# Patient Record
Sex: FEMALE | Race: OTHER | HISPANIC OR LATINO | URBAN - METROPOLITAN AREA
[De-identification: names, ages, dates, MRNs, and addresses within clinical notes are randomized per-mention and may not be internally consistent; named-entity substitution may affect disease eponyms.]

---

## 2017-09-08 ENCOUNTER — OUTPATIENT (OUTPATIENT)
Dept: OUTPATIENT SERVICES | Facility: HOSPITAL | Age: 32
LOS: 1 days | End: 2017-09-08
Payer: COMMERCIAL

## 2017-09-08 DIAGNOSIS — O26.899 OTHER SPECIFIED PREGNANCY RELATED CONDITIONS, UNSPECIFIED TRIMESTER: ICD-10-CM

## 2017-09-08 DIAGNOSIS — Z3A.00 WEEKS OF GESTATION OF PREGNANCY NOT SPECIFIED: ICD-10-CM

## 2017-09-08 PROCEDURE — 59025 FETAL NON-STRESS TEST: CPT

## 2017-09-08 PROCEDURE — G0463: CPT

## 2017-09-09 ENCOUNTER — INPATIENT (INPATIENT)
Facility: HOSPITAL | Age: 32
LOS: 2 days | Discharge: HOME | End: 2017-09-12
Attending: STUDENT IN AN ORGANIZED HEALTH CARE EDUCATION/TRAINING PROGRAM | Admitting: STUDENT IN AN ORGANIZED HEALTH CARE EDUCATION/TRAINING PROGRAM
Payer: COMMERCIAL

## 2017-09-09 VITALS
SYSTOLIC BLOOD PRESSURE: 110 MMHG | DIASTOLIC BLOOD PRESSURE: 54 MMHG | RESPIRATION RATE: 17 BRPM | HEART RATE: 118 BPM | OXYGEN SATURATION: 99 %

## 2017-09-09 DIAGNOSIS — Z3A.00 WEEKS OF GESTATION OF PREGNANCY NOT SPECIFIED: ICD-10-CM

## 2017-09-09 DIAGNOSIS — O26.899 OTHER SPECIFIED PREGNANCY RELATED CONDITIONS, UNSPECIFIED TRIMESTER: ICD-10-CM

## 2017-09-09 DIAGNOSIS — Z34.80 ENCOUNTER FOR SUPERVISION OF OTHER NORMAL PREGNANCY, UNSPECIFIED TRIMESTER: ICD-10-CM

## 2017-09-09 LAB
ANION GAP SERPL CALC-SCNC: 13 MMOL/L — SIGNIFICANT CHANGE UP (ref 5–17)
APTT BLD: 28.2 SEC — SIGNIFICANT CHANGE UP (ref 27.5–37.4)
BLD GP AB SCN SERPL QL: NEGATIVE — SIGNIFICANT CHANGE UP
BUN SERPL-MCNC: 7 MG/DL — SIGNIFICANT CHANGE UP (ref 7–23)
CALCIUM SERPL-MCNC: 8.4 MG/DL — SIGNIFICANT CHANGE UP (ref 8.4–10.5)
CHLORIDE SERPL-SCNC: 106 MMOL/L — SIGNIFICANT CHANGE UP (ref 96–108)
CO2 SERPL-SCNC: 20 MMOL/L — LOW (ref 22–31)
CREAT SERPL-MCNC: 0.81 MG/DL — SIGNIFICANT CHANGE UP (ref 0.5–1.3)
GLUCOSE SERPL-MCNC: 123 MG/DL — HIGH (ref 70–99)
HCT VFR BLD CALC: 22.3 % — LOW (ref 34.5–45)
HCT VFR BLD CALC: 22.8 % — LOW (ref 34.5–45)
HCT VFR BLD CALC: 38.8 % — SIGNIFICANT CHANGE UP (ref 34.5–45)
HGB BLD-MCNC: 13.4 G/DL — SIGNIFICANT CHANGE UP (ref 11.5–15.5)
HGB BLD-MCNC: 7.8 G/DL — LOW (ref 11.5–15.5)
HGB BLD-MCNC: 7.9 G/DL — LOW (ref 11.5–15.5)
INR BLD: 1.02 RATIO — SIGNIFICANT CHANGE UP (ref 0.88–1.16)
MCHC RBC-ENTMCNC: 28.9 PG — SIGNIFICANT CHANGE UP (ref 27–34)
MCHC RBC-ENTMCNC: 29.5 PG — SIGNIFICANT CHANGE UP (ref 27–34)
MCHC RBC-ENTMCNC: 29.7 PG — SIGNIFICANT CHANGE UP (ref 27–34)
MCHC RBC-ENTMCNC: 34.5 GM/DL — SIGNIFICANT CHANGE UP (ref 32–36)
MCHC RBC-ENTMCNC: 34.6 GM/DL — SIGNIFICANT CHANGE UP (ref 32–36)
MCHC RBC-ENTMCNC: 35 GM/DL — SIGNIFICANT CHANGE UP (ref 32–36)
MCV RBC AUTO: 83.7 FL — SIGNIFICANT CHANGE UP (ref 80–100)
MCV RBC AUTO: 84.9 FL — SIGNIFICANT CHANGE UP (ref 80–100)
MCV RBC AUTO: 85.3 FL — SIGNIFICANT CHANGE UP (ref 80–100)
PLATELET # BLD AUTO: 107 K/UL — LOW (ref 150–400)
PLATELET # BLD AUTO: 119 K/UL — LOW (ref 150–400)
PLATELET # BLD AUTO: 150 K/UL — SIGNIFICANT CHANGE UP (ref 150–400)
POTASSIUM SERPL-MCNC: 4.5 MMOL/L — SIGNIFICANT CHANGE UP (ref 3.5–5.3)
POTASSIUM SERPL-SCNC: 4.5 MMOL/L — SIGNIFICANT CHANGE UP (ref 3.5–5.3)
PROTHROM AB SERPL-ACNC: 11 SEC — SIGNIFICANT CHANGE UP (ref 9.8–12.7)
RBC # BLD: 2.63 M/UL — LOW (ref 3.8–5.2)
RBC # BLD: 2.67 M/UL — LOW (ref 3.8–5.2)
RBC # BLD: 4.63 M/UL — SIGNIFICANT CHANGE UP (ref 3.8–5.2)
RBC # FLD: 15.7 % — HIGH (ref 10.3–14.5)
RBC # FLD: 15.9 % — HIGH (ref 10.3–14.5)
RBC # FLD: 16.1 % — HIGH (ref 10.3–14.5)
RH IG SCN BLD-IMP: POSITIVE — SIGNIFICANT CHANGE UP
RH IG SCN BLD-IMP: POSITIVE — SIGNIFICANT CHANGE UP
SODIUM SERPL-SCNC: 139 MMOL/L — SIGNIFICANT CHANGE UP (ref 135–145)
T PALLIDUM AB TITR SER: NEGATIVE — SIGNIFICANT CHANGE UP
WBC # BLD: 15.9 K/UL — HIGH (ref 3.8–10.5)
WBC # BLD: 18.8 K/UL — HIGH (ref 3.8–10.5)
WBC # BLD: 19.8 K/UL — HIGH (ref 3.8–10.5)
WBC # FLD AUTO: 15.9 K/UL — HIGH (ref 3.8–10.5)
WBC # FLD AUTO: 18.8 K/UL — HIGH (ref 3.8–10.5)
WBC # FLD AUTO: 19.8 K/UL — HIGH (ref 3.8–10.5)

## 2017-09-09 RX ORDER — PENICILLIN G POTASSIUM 5000000 [IU]/1
POWDER, FOR SOLUTION INTRAMUSCULAR; INTRAPLEURAL; INTRATHECAL; INTRAVENOUS
Qty: 0 | Refills: 0 | Status: DISCONTINUED | OUTPATIENT
Start: 2017-09-09 | End: 2017-09-09

## 2017-09-09 RX ORDER — OXYTOCIN 10 UNIT/ML
41.67 VIAL (ML) INJECTION
Qty: 20 | Refills: 0 | Status: DISCONTINUED | OUTPATIENT
Start: 2017-09-09 | End: 2017-09-09

## 2017-09-09 RX ORDER — TRANEXAMIC ACID 100 MG/ML
1000 INJECTION, SOLUTION INTRAVENOUS ONCE
Qty: 0 | Refills: 0 | Status: COMPLETED | OUTPATIENT
Start: 2017-09-09 | End: 2017-09-09

## 2017-09-09 RX ORDER — IBUPROFEN 200 MG
600 TABLET ORAL EVERY 6 HOURS
Qty: 0 | Refills: 0 | Status: COMPLETED | OUTPATIENT
Start: 2017-09-09 | End: 2018-08-08

## 2017-09-09 RX ORDER — LANOLIN
1 OINTMENT (GRAM) TOPICAL EVERY 6 HOURS
Qty: 0 | Refills: 0 | Status: DISCONTINUED | OUTPATIENT
Start: 2017-09-09 | End: 2017-09-12

## 2017-09-09 RX ORDER — TETANUS TOXOID, REDUCED DIPHTHERIA TOXOID AND ACELLULAR PERTUSSIS VACCINE, ADSORBED 5; 2.5; 8; 8; 2.5 [IU]/.5ML; [IU]/.5ML; UG/.5ML; UG/.5ML; UG/.5ML
0.5 SUSPENSION INTRAMUSCULAR ONCE
Qty: 0 | Refills: 0 | Status: DISCONTINUED | OUTPATIENT
Start: 2017-09-09 | End: 2017-09-12

## 2017-09-09 RX ORDER — DIBUCAINE 1 %
1 OINTMENT (GRAM) RECTAL EVERY 4 HOURS
Qty: 0 | Refills: 0 | Status: DISCONTINUED | OUTPATIENT
Start: 2017-09-09 | End: 2017-09-12

## 2017-09-09 RX ORDER — SODIUM CHLORIDE 9 MG/ML
2000 INJECTION, SOLUTION INTRAVENOUS ONCE
Qty: 0 | Refills: 0 | Status: DISCONTINUED | OUTPATIENT
Start: 2017-09-09 | End: 2017-09-12

## 2017-09-09 RX ORDER — CARBOPROST TROMETHAMINE 250 UG/ML
250 INJECTION, SOLUTION INTRAMUSCULAR ONCE
Qty: 0 | Refills: 0 | Status: COMPLETED | OUTPATIENT
Start: 2017-09-09 | End: 2017-09-09

## 2017-09-09 RX ORDER — SODIUM CHLORIDE 9 MG/ML
1000 INJECTION, SOLUTION INTRAVENOUS
Qty: 0 | Refills: 0 | Status: DISCONTINUED | OUTPATIENT
Start: 2017-09-09 | End: 2017-09-09

## 2017-09-09 RX ORDER — OXYCODONE HYDROCHLORIDE 5 MG/1
5 TABLET ORAL
Qty: 0 | Refills: 0 | Status: DISCONTINUED | OUTPATIENT
Start: 2017-09-09 | End: 2017-09-12

## 2017-09-09 RX ORDER — PRAMOXINE HYDROCHLORIDE 150 MG/15G
1 AEROSOL, FOAM RECTAL EVERY 4 HOURS
Qty: 0 | Refills: 0 | Status: DISCONTINUED | OUTPATIENT
Start: 2017-09-09 | End: 2017-09-12

## 2017-09-09 RX ORDER — SODIUM CHLORIDE 9 MG/ML
3 INJECTION INTRAMUSCULAR; INTRAVENOUS; SUBCUTANEOUS EVERY 8 HOURS
Qty: 0 | Refills: 0 | Status: DISCONTINUED | OUTPATIENT
Start: 2017-09-09 | End: 2017-09-09

## 2017-09-09 RX ORDER — AER TRAVELER 0.5 G/1
1 SOLUTION RECTAL; TOPICAL EVERY 4 HOURS
Qty: 0 | Refills: 0 | Status: DISCONTINUED | OUTPATIENT
Start: 2017-09-09 | End: 2017-09-09

## 2017-09-09 RX ORDER — SIMETHICONE 80 MG/1
80 TABLET, CHEWABLE ORAL EVERY 6 HOURS
Qty: 0 | Refills: 0 | Status: DISCONTINUED | OUTPATIENT
Start: 2017-09-09 | End: 2017-09-12

## 2017-09-09 RX ORDER — HYDROMORPHONE HYDROCHLORIDE 2 MG/ML
0.5 INJECTION INTRAMUSCULAR; INTRAVENOUS; SUBCUTANEOUS ONCE
Qty: 0 | Refills: 0 | Status: DISCONTINUED | OUTPATIENT
Start: 2017-09-09 | End: 2017-09-09

## 2017-09-09 RX ORDER — OXYTOCIN 10 UNIT/ML
333.33 VIAL (ML) INJECTION
Qty: 20 | Refills: 0 | Status: COMPLETED | OUTPATIENT
Start: 2017-09-09 | End: 2017-09-09

## 2017-09-09 RX ORDER — ACETAMINOPHEN 500 MG
975 TABLET ORAL EVERY 6 HOURS
Qty: 0 | Refills: 0 | Status: DISCONTINUED | OUTPATIENT
Start: 2017-09-09 | End: 2017-09-12

## 2017-09-09 RX ORDER — GLYCERIN ADULT
1 SUPPOSITORY, RECTAL RECTAL AT BEDTIME
Qty: 0 | Refills: 0 | Status: DISCONTINUED | OUTPATIENT
Start: 2017-09-09 | End: 2017-09-12

## 2017-09-09 RX ORDER — HYDROCORTISONE 1 %
1 OINTMENT (GRAM) TOPICAL EVERY 4 HOURS
Qty: 0 | Refills: 0 | Status: DISCONTINUED | OUTPATIENT
Start: 2017-09-09 | End: 2017-09-09

## 2017-09-09 RX ORDER — IBUPROFEN 200 MG
600 TABLET ORAL EVERY 6 HOURS
Qty: 0 | Refills: 0 | Status: DISCONTINUED | OUTPATIENT
Start: 2017-09-09 | End: 2017-09-12

## 2017-09-09 RX ORDER — DOCUSATE SODIUM 100 MG
100 CAPSULE ORAL
Qty: 0 | Refills: 0 | Status: DISCONTINUED | OUTPATIENT
Start: 2017-09-09 | End: 2017-09-12

## 2017-09-09 RX ORDER — AER TRAVELER 0.5 G/1
1 SOLUTION RECTAL; TOPICAL EVERY 4 HOURS
Qty: 0 | Refills: 0 | Status: DISCONTINUED | OUTPATIENT
Start: 2017-09-09 | End: 2017-09-12

## 2017-09-09 RX ORDER — PENICILLIN G POTASSIUM 5000000 [IU]/1
5 POWDER, FOR SOLUTION INTRAMUSCULAR; INTRAPLEURAL; INTRATHECAL; INTRAVENOUS ONCE
Qty: 0 | Refills: 0 | Status: COMPLETED | OUTPATIENT
Start: 2017-09-09 | End: 2017-09-09

## 2017-09-09 RX ORDER — DIPHENHYDRAMINE HCL 50 MG
25 CAPSULE ORAL EVERY 6 HOURS
Qty: 0 | Refills: 0 | Status: DISCONTINUED | OUTPATIENT
Start: 2017-09-09 | End: 2017-09-12

## 2017-09-09 RX ORDER — MAGNESIUM HYDROXIDE 400 MG/1
30 TABLET, CHEWABLE ORAL
Qty: 0 | Refills: 0 | Status: DISCONTINUED | OUTPATIENT
Start: 2017-09-09 | End: 2017-09-12

## 2017-09-09 RX ORDER — PENICILLIN G POTASSIUM 5000000 [IU]/1
2.5 POWDER, FOR SOLUTION INTRAMUSCULAR; INTRAPLEURAL; INTRATHECAL; INTRAVENOUS EVERY 4 HOURS
Qty: 0 | Refills: 0 | Status: DISCONTINUED | OUTPATIENT
Start: 2017-09-09 | End: 2017-09-09

## 2017-09-09 RX ORDER — CITRIC ACID/SODIUM CITRATE 300-500 MG
15 SOLUTION, ORAL ORAL EVERY 4 HOURS
Qty: 0 | Refills: 0 | Status: DISCONTINUED | OUTPATIENT
Start: 2017-09-09 | End: 2017-09-09

## 2017-09-09 RX ORDER — SODIUM CHLORIDE 9 MG/ML
3 INJECTION INTRAMUSCULAR; INTRAVENOUS; SUBCUTANEOUS EVERY 8 HOURS
Qty: 0 | Refills: 0 | Status: DISCONTINUED | OUTPATIENT
Start: 2017-09-09 | End: 2017-09-12

## 2017-09-09 RX ORDER — ACETAMINOPHEN 500 MG
975 TABLET ORAL EVERY 6 HOURS
Qty: 0 | Refills: 0 | Status: COMPLETED | OUTPATIENT
Start: 2017-09-09 | End: 2018-08-08

## 2017-09-09 RX ORDER — SODIUM CHLORIDE 9 MG/ML
500 INJECTION, SOLUTION INTRAVENOUS ONCE
Qty: 0 | Refills: 0 | Status: DISCONTINUED | OUTPATIENT
Start: 2017-09-09 | End: 2017-09-09

## 2017-09-09 RX ORDER — OXYCODONE HYDROCHLORIDE 5 MG/1
5 TABLET ORAL EVERY 4 HOURS
Qty: 0 | Refills: 0 | Status: DISCONTINUED | OUTPATIENT
Start: 2017-09-09 | End: 2017-09-12

## 2017-09-09 RX ORDER — OXYTOCIN 10 UNIT/ML
41.67 VIAL (ML) INJECTION
Qty: 20 | Refills: 0 | Status: DISCONTINUED | OUTPATIENT
Start: 2017-09-09 | End: 2017-09-12

## 2017-09-09 RX ORDER — OXYTOCIN 10 UNIT/ML
333.33 VIAL (ML) INJECTION
Qty: 20 | Refills: 0 | Status: COMPLETED | OUTPATIENT
Start: 2017-09-09

## 2017-09-09 RX ORDER — DIBUCAINE 1 %
1 OINTMENT (GRAM) RECTAL EVERY 4 HOURS
Qty: 0 | Refills: 0 | Status: DISCONTINUED | OUTPATIENT
Start: 2017-09-09 | End: 2017-09-09

## 2017-09-09 RX ORDER — HYDROCORTISONE 1 %
1 OINTMENT (GRAM) TOPICAL EVERY 4 HOURS
Qty: 0 | Refills: 0 | Status: DISCONTINUED | OUTPATIENT
Start: 2017-09-09 | End: 2017-09-12

## 2017-09-09 RX ORDER — PRAMOXINE HYDROCHLORIDE 150 MG/15G
1 AEROSOL, FOAM RECTAL EVERY 4 HOURS
Qty: 0 | Refills: 0 | Status: DISCONTINUED | OUTPATIENT
Start: 2017-09-09 | End: 2017-09-09

## 2017-09-09 RX ORDER — KETOROLAC TROMETHAMINE 30 MG/ML
30 SYRINGE (ML) INJECTION ONCE
Qty: 0 | Refills: 0 | Status: DISCONTINUED | OUTPATIENT
Start: 2017-09-09 | End: 2017-09-12

## 2017-09-09 RX ADMIN — HYDROMORPHONE HYDROCHLORIDE 0.5 MILLIGRAM(S): 2 INJECTION INTRAMUSCULAR; INTRAVENOUS; SUBCUTANEOUS at 18:25

## 2017-09-09 RX ADMIN — CARBOPROST TROMETHAMINE 250 MICROGRAM(S): 250 INJECTION, SOLUTION INTRAMUSCULAR at 17:40

## 2017-09-09 RX ADMIN — Medication 975 MILLIGRAM(S): at 14:34

## 2017-09-09 RX ADMIN — Medication 0.2 MILLIGRAM(S): at 17:23

## 2017-09-09 RX ADMIN — TRANEXAMIC ACID 220 MILLIGRAM(S): 100 INJECTION, SOLUTION INTRAVENOUS at 18:20

## 2017-09-09 RX ADMIN — Medication 0.2 MILLIGRAM(S): at 21:41

## 2017-09-09 RX ADMIN — Medication 1000 MILLIUNIT(S)/MIN: at 13:47

## 2017-09-09 RX ADMIN — PENICILLIN G POTASSIUM 200 MILLION UNIT(S): 5000000 POWDER, FOR SOLUTION INTRAMUSCULAR; INTRAPLEURAL; INTRATHECAL; INTRAVENOUS at 06:09

## 2017-09-09 RX ADMIN — OXYCODONE HYDROCHLORIDE 5 MILLIGRAM(S): 5 TABLET ORAL at 15:10

## 2017-09-09 RX ADMIN — Medication 975 MILLIGRAM(S): at 15:04

## 2017-09-09 RX ADMIN — OXYCODONE HYDROCHLORIDE 5 MILLIGRAM(S): 5 TABLET ORAL at 14:40

## 2017-09-09 RX ADMIN — Medication 0.2 MILLIGRAM(S): at 17:55

## 2017-09-09 NOTE — PATIENT PROFILE OB - NS PRO PT RIGHT SUPPORT PERSON
Office Visit Note  [facility]  [phone]    Mar 21, 2017    [unfilled]    1972    UROLOGIC DIAGNOSES:    Ureteral stone    CURRENT INTERVENTIONS:    S/P extraction    History:    Eugenie returns to clinic today for postoperative stent removal. The stent has been bothersome to her but overall she has felt well since the procedure. Stone was composed of calcium oxalate monohydrate.      Imaging:      Labs:      MEDICATIONS:    Current Outpatient Prescriptions:      ibuprofen (ADVIL/MOTRIN) 600 MG tablet, Take 600 mg by mouth, Disp: , Rfl:      nitrofurantoin, macrocrystal-monohydrate, (MACROBID) 100 MG capsule, Take 1 capsule (100 mg) by mouth once for 1 dose, Disp: 1 capsule, Rfl: 0     oxybutynin (DITROPAN XL) 10 MG 24 hr tablet, Take 1 tablet (10 mg) by mouth daily, Disp: 30 tablet, Rfl: 0     HYDROcodone-acetaminophen (NORCO) 5-325 MG per tablet, Take 1-2 tablets by mouth every 4 hours as needed for moderate to severe pain (Moderate to Severe Pain), Disp: 5 tablet, Rfl: 0     tamsulosin (FLOMAX) 0.4 MG capsule, Take 1 capsule (0.4 mg) by mouth daily, Disp: 8 capsule, Rfl: 1     oxyCODONE-acetaminophen (PERCOCET) 5-325 MG per tablet, Take 1-2 tablets by mouth every 4 hours as needed for moderate to severe pain, Disp: 15 tablet, Rfl: 0     tamsulosin (FLOMAX) 0.4 MG capsule, Take 1 capsule (0.4 mg) by mouth daily, Disp: 7 capsule, Rfl: 1     SUMAtriptan (IMITREX) 6 MG/0.5ML SOLN, Inject 6 mg Subcutaneous every 2 hours as needed (migraine. ) Max dose is 12mg/24hrs, Disp: , Rfl:     ALLERGIES:     Allergies   Allergen Reactions     Ciprofloxacin Hives     Morphine Sulfate      Sulfa Drugs Hives       REVIEW OF SYSTEMS: Ten point review of systems without change as outlined in HPI    SURGICAL HISTORY:    Past Surgical History   Procedure Laterality Date     Hysterectomy       Tonsillectomy & adenoidectomy       Cl aff surgical pathology       Appendectomy       Cholecystectomy        "Laparoscopy       Cl aff surgical pathology       C removal of kidney stone       Discectomy lumbar posterior microscopic one level  9/3/2013     Procedure: DISCECTOMY LUMBAR POSTERIOR MICROSCOPIC ONE LEVEL;  Left Lumbar 5-Sacral 1 Microdiscectomy ;  Surgeon: Karl Anglin MD;  Location: [facility] OR     Orthopedic surgery       Combined cystoscopy, retrogrades, ureteroscopy, laser holmium lithotripsy ureter(s), insert stent Left 3/13/2017     Procedure: COMBINED CYSTOSCOPY, RETROGRADES, URETEROSCOPY, LASER HOLMIUM LITHOTRIPSY URETER(S), INSERT STENT;  Surgeon: Greg Luque MD;  Location: [facility] OR         PHYSICAL EXAM:    Ht 1.651 m (5' 5\")  Wt 127 kg (280 lb)  BMI 46.59 kg/m2    HEENT: Normocephalic and atraumatic    Cardiac: Not done    Back/Flank: Not done    CNS/PNS: Alert and oriented    Respiratory: Normal nonlabored breathing    Abdomen: Soft nontender and nondistended    Peripheral Vascular: No peripheral edema    Mental Status: Normal    External Genitalia: Not done    Bladder: Not done    Urethra: Not done     Vagina: Not done    Cystoscopy:  I performed flexible cystoscopy today and the stent was removed without difficulty      Urinalysis:  UA RESULTS:  Recent Labs   Lab Test  03/12/17   1624   COLOR  Radha   APPEARANCE  Clear   URINEGLC  Negative   URINEBILI  Negative   URINEKETONE  5*   SG  1.017   UBLD  Small*   URINEPH  6.0   PROTEIN  Negative   NITRITE  Positive*   LEUKEST  Negative   RBCU  17*   WBCU  2         IMPRESSION:    Doing well, stent removed    PLAN:    We discussed prevention methods for future stones. She will follow-up with me as needed in the future.    Total Time:  15 minutes  Time in Consultation: 10 minutes      Greg Luque M.D.        " Yes

## 2017-09-09 NOTE — PROVIDER CONTACT NOTE (OTHER) - ACTION/TREATMENT ORDERED:
Was examined by Dr. Daniel and manaually expressed larged blood clots , approx another 500ml's . methergine .2mg given and hemabate v250 mcg given. CBC drawn and sent to lab. Pt transferred to l and manohar

## 2017-09-09 NOTE — PROVIDER CONTACT NOTE (OTHER) - ASSESSMENT
Pt alert and oriented. B/P 101/62, pulse 88 and resp 18 .Fundus firm . Lochia rubra and large amount.

## 2017-09-09 NOTE — CHART NOTE - NSCHARTNOTEFT_GEN_A_CORE
Patient is 32yoF  POD0 from vacuum assisted vaginal delivery for NRFHT. Called to pt bedside for continued bleeding on PP floor. Pt with 300cc of blood on pads and blue chux over a 2hr period. Additionally, pt and her partner report that she had a syncopal episode without fall while sitting on the toilet in L&D    Vital Signs Last 24 Hrs  T(C): 36.8 (09 Sep 2017 16:45), Max: 37.9 (09 Sep 2017 13:50)  T(F): 98.2 (09 Sep 2017 16:45), Max: 100.2 (09 Sep 2017 13:50)  HR: 88 (09 Sep 2017 16:45) (70 - 118)  BP: 101/62 (09 Sep 2017 16:45) (101/62 - 117/76)  BP(mean): --  RR: 18 (09 Sep 2017 16:45) (17 - 18)  SpO2: 98% (09 Sep 2017 16:45) (98% - 99%)  I&O's Detail    09 Sep 2017 07:01  -  09 Sep 2017 17:13  --------------------------------------------------------  IN:    dextrose 5% + lactated ringers.: 350 mL    Lactated Ringers IV Bolus: 1500 mL    oxytocin Infusion: 1000 mL  Total IN: 2850 mL    OUT:    Estimated Blood Loss: 500 mL    Intermittent Catheterization - Urethral: 800 mL  Total OUT: 1300 mL    Total NET: 1550 mL          Labs:                        13.4   15.9  )-----------( 150      ( 09 Sep 2017 05:52 )             38.8       Fibrinogen:   Lactate:   MEDICATIONS  (STANDING):  ketorolac   Injectable 30 milliGRAM(s) IV Push once  oxyCODONE    IR 5 milliGRAM(s) Oral every 3 hours  sodium chloride 0.9% lock flush 3 milliLiter(s) IV Push every 8 hours  diphtheria/tetanus/pertussis (acellular) Vaccine (ADAcel) 0.5 milliLiter(s) IntraMuscular once  oxytocin Infusion 41.667 milliUNIT(s)/Min (125 mL/Hr) IV Continuous <Continuous>  prenatal multivitamin 1 Tablet(s) Oral daily  acetaminophen   Tablet. 975 milliGRAM(s) Oral every 6 hours  ibuprofen  Tablet 600 milliGRAM(s) Oral every 6 hours      Evaluation :      Management and Follow-up plan :      Libby Briscoe MD PGY1 Patient is 32yoF  POD0 from vacuum assisted vaginal delivery for NRFHT. Called to pt bedside for continued bleeding on PP floor. Pt with 250cc of blood on pads and blue chux over a 2hr period. Additionally, pt and her partner report that she had a syncopal episode without fall while sitting on the toilet in L&D. Pt reports she feels well overall but that she has not moved much since coming to the floor. Pt has not stood since she has been on the floor.     Vital Signs Last 24 Hrs  T(C): 36.8 (09 Sep 2017 16:45), Max: 37.9 (09 Sep 2017 13:50)  T(F): 98.2 (09 Sep 2017 16:45), Max: 100.2 (09 Sep 2017 13:50)  HR: 88 (09 Sep 2017 16:45) (70 - 118)  BP: 101/62 (09 Sep 2017 16:45) (101/62 - 117/76)  BP(mean): --  RR: 18 (09 Sep 2017 16:45) (17 - 18)  SpO2: 98% (09 Sep 2017 16:45) (98% - 99%)  I&O's Detail    09 Sep 2017 07:01  -  09 Sep 2017 17:13  --------------------------------------------------------  IN:    dextrose 5% + lactated ringers.: 350 mL    Lactated Ringers IV Bolus: 1500 mL    oxytocin Infusion: 1000 mL  Total IN: 2850 mL    OUT:    Estimated Blood Loss: 500 mL    Intermittent Catheterization - Urethral: 800 mL  Total OUT: 1300 mL    Total NET: 1550 mL          Labs:                        13.4   15.9  )-----------( 150      ( 09 Sep 2017 05:52 )             38.8       Fibrinogen:   Lactate:   MEDICATIONS  (STANDING):  ketorolac   Injectable 30 milliGRAM(s) IV Push once  oxyCODONE    IR 5 milliGRAM(s) Oral every 3 hours  sodium chloride 0.9% lock flush 3 milliLiter(s) IV Push every 8 hours  diphtheria/tetanus/pertussis (acellular) Vaccine (ADAcel) 0.5 milliLiter(s) IntraMuscular once  oxytocin Infusion 41.667 milliUNIT(s)/Min (125 mL/Hr) IV Continuous <Continuous>  prenatal multivitamin 1 Tablet(s) Oral daily  acetaminophen   Tablet. 975 milliGRAM(s) Oral every 6 hours  ibuprofen  Tablet 600 milliGRAM(s) Oral every 6 hours      Evaluation :  Fundus boggy. Dr. Daniel performed a manual exam and extracted an additional 175cc of clot and blood. IM methergine and hemabate given. Uterus still boggy.    Management and Follow-up plan :  Pt to be brought down to PACU for closer monitoring. Second IV line to be placed with 2L bolus started. Additional dose of IM methergine to be given. TXA to be given. STAT CBC, BMP, and coags ordered.      Dr. Heaton aware and to be meeting patient in PACU.    Libby Briscoe MD PGY1

## 2017-09-09 NOTE — CHART NOTE - NSCHARTNOTEFT_GEN_A_CORE
R4 Note     Called to bedside for heavy vaginal bleeding on the postpartum floor. Pt was s/p a VAVD this morning with [id number], she had a PPH while in LDR of 300cc. She received 1000mcg cytotec per rectum and fundus was firm. Vital signs within normal limits at that time. She was stable after some time and went to postpartum floor. At bedside, patient had 300cc of blood on king and fundus was boggy. A bimanual examination was performed with evacuation of 200cc of clot with continued slow bleed. Methergine 0.2mcg IM and Hemabate 0.25mcg IM were given. Pt was brought down to recovery room where a 2nd IV line was placed, 2L IV fluid was given, she received TXA 1000mg IV and an additional dose of methergine 0.2mg IM. Rodriguez catheter placed with 800cc output. Bedside sono performed with endometrial thickening of 2.5cm. Fundus became firm with no further bleeding. Vital signs remained HR 80s and /70s throughout and patient was asymptomatic. H/H returned 7.8/22.3 from 13.4/38.8.   Plan is for continued close monitoring of vital signs/UOP in PACU, Methergine series, and repeat CBC at 9pm. Will hold on transfusion as patient no longer bleeding, she is asymptomatic and vital signs wnl.   D/W Dr. Mehdi Daniel, PGY4

## 2017-09-09 NOTE — PROVIDER CONTACT NOTE (OTHER) - BACKGROUND
Primit  Vac assist Primit  Vac assist delivery day. No significant medical history. Pt had cytotec in recovery room. Had EBL 1000 ml's in l and d

## 2017-09-10 LAB
HCT VFR BLD CALC: 20.2 % — CRITICAL LOW (ref 34.5–45)
HCT VFR BLD CALC: 28.9 % — LOW (ref 34.5–45)
HGB BLD-MCNC: 10.1 G/DL — LOW (ref 11.5–15.5)
HGB BLD-MCNC: 6.9 G/DL — CRITICAL LOW (ref 11.5–15.5)
MCHC RBC-ENTMCNC: 29.8 PG — SIGNIFICANT CHANGE UP (ref 27–34)
MCHC RBC-ENTMCNC: 34.9 GM/DL — SIGNIFICANT CHANGE UP (ref 32–36)
MCV RBC AUTO: 85.5 FL — SIGNIFICANT CHANGE UP (ref 80–100)
PLATELET # BLD AUTO: 152 K/UL — SIGNIFICANT CHANGE UP (ref 150–400)
RBC # BLD: 3.38 M/UL — LOW (ref 3.8–5.2)
RBC # FLD: 17 % — HIGH (ref 10.3–14.5)
WBC # BLD: 17.7 K/UL — HIGH (ref 3.8–10.5)
WBC # FLD AUTO: 17.7 K/UL — HIGH (ref 3.8–10.5)

## 2017-09-10 RX ORDER — DIPHENHYDRAMINE HCL 50 MG
25 CAPSULE ORAL ONCE
Qty: 0 | Refills: 0 | Status: DISCONTINUED | OUTPATIENT
Start: 2017-09-10 | End: 2017-09-10

## 2017-09-10 RX ORDER — DIPHENHYDRAMINE HCL 50 MG
25 CAPSULE ORAL ONCE
Qty: 0 | Refills: 0 | Status: COMPLETED | OUTPATIENT
Start: 2017-09-10 | End: 2017-09-10

## 2017-09-10 RX ORDER — ACETAMINOPHEN 500 MG
975 TABLET ORAL ONCE
Qty: 0 | Refills: 0 | Status: DISCONTINUED | OUTPATIENT
Start: 2017-09-10 | End: 2017-09-10

## 2017-09-10 RX ORDER — DIPHENHYDRAMINE HCL 50 MG
25 CAPSULE ORAL ONCE
Qty: 0 | Refills: 0 | Status: DISCONTINUED | OUTPATIENT
Start: 2017-09-10 | End: 2017-09-12

## 2017-09-10 RX ORDER — ACETAMINOPHEN 500 MG
975 TABLET ORAL ONCE
Qty: 0 | Refills: 0 | Status: COMPLETED | OUTPATIENT
Start: 2017-09-10 | End: 2017-09-10

## 2017-09-10 RX ADMIN — SODIUM CHLORIDE 3 MILLILITER(S): 9 INJECTION INTRAMUSCULAR; INTRAVENOUS; SUBCUTANEOUS at 05:56

## 2017-09-10 RX ADMIN — Medication 975 MILLIGRAM(S): at 18:06

## 2017-09-10 RX ADMIN — Medication 0.2 MILLIGRAM(S): at 09:50

## 2017-09-10 RX ADMIN — Medication 975 MILLIGRAM(S): at 10:45

## 2017-09-10 RX ADMIN — Medication 975 MILLIGRAM(S): at 18:10

## 2017-09-10 RX ADMIN — SODIUM CHLORIDE 3 MILLILITER(S): 9 INJECTION INTRAMUSCULAR; INTRAVENOUS; SUBCUTANEOUS at 15:06

## 2017-09-10 RX ADMIN — Medication 0.2 MILLIGRAM(S): at 05:57

## 2017-09-10 RX ADMIN — SODIUM CHLORIDE 3 MILLILITER(S): 9 INJECTION INTRAMUSCULAR; INTRAVENOUS; SUBCUTANEOUS at 05:19

## 2017-09-10 RX ADMIN — SODIUM CHLORIDE 3 MILLILITER(S): 9 INJECTION INTRAMUSCULAR; INTRAVENOUS; SUBCUTANEOUS at 22:20

## 2017-09-10 RX ADMIN — Medication 0.2 MILLIGRAM(S): at 01:57

## 2017-09-10 RX ADMIN — Medication 25 MILLIGRAM(S): at 10:38

## 2017-09-10 RX ADMIN — Medication 0.2 MILLIGRAM(S): at 14:18

## 2017-09-10 RX ADMIN — Medication 0.2 MILLIGRAM(S): at 18:05

## 2017-09-10 RX ADMIN — Medication 25 MILLIGRAM(S): at 01:09

## 2017-09-10 RX ADMIN — Medication 1 TABLET(S): at 18:06

## 2017-09-10 NOTE — CHART NOTE - NSCHARTNOTEFT_GEN_A_CORE
32yoF  PPD#1 from vacuum assisted vaginal delivery complicated by PPH with total EBL 1250. Pt's H&H has continue to drop on AM labs. Orthostatics were completed following these results and were negative (114/64 96->127/63 114->122/58 104). Dr. Harding aware and would like the pt to receive 2 units of PRBC. Pt counseled on transfusion and is declining transfusion at present. Pt states she is asymptomatic and feels well.    Vital Signs Last 24 Hrs  T(C): 36.8 (10 Sep 2017 05:10), Max: 37.9 (09 Sep 2017 13:50)  T(F): 98.2 (10 Sep 2017 05:10), Max: 100.2 (09 Sep 2017 13:50)  HR: 104 (10 Sep 2017 07:46) (70 - 118)  BP: 122/58 (10 Sep 2017 07:46) (101/62 - 137/80)  BP(mean): 84 (10 Sep 2017 07:46) (84 - 103)  RR: 23 (10 Sep 2017 07:46) (17 - 26)  SpO2: 97% (10 Sep 2017 07:46) (97% - 100%)  I&O's Detail    09 Sep 2017 07:01  -  10 Sep 2017 07:00  --------------------------------------------------------  IN:    dextrose 5% + lactated ringers.: 350 mL    Lactated Ringers IV Bolus: 1500 mL    oxytocin Infusion: 1000 mL  Total IN: 2850 mL    OUT:    Estimated Blood Loss: 500 mL    Intermittent Catheterization - Urethral: 4560 mL  Total OUT: 5060 mL    Total NET: -2210 mL      10 Sep 2017 07:01  -  10 Sep 2017 09:15  --------------------------------------------------------  IN:  Total IN: 0 mL    OUT:    Intermittent Catheterization - Urethral: 350 mL  Total OUT: 350 mL    Total NET: -350 mL          Labs:                        6.9    x     )-----------( x        ( 10 Sep 2017 06:29 )             20.2                         7.9    18.8  )-----------( 107      ( 09 Sep 2017 21:02 )             22.8                         7.8    19.8  )-----------( 119      ( 09 Sep 2017 17:19 )             22.3                         13.4   15.9  )-----------( 150      ( 09 Sep 2017 05:52 )             38.8     PT/INR - ( 09 Sep 2017 18:07 )   PT: 11.0 sec;   INR: 1.02 ratio         PTT - ( 09 Sep 2017 18:07 )  PTT:28.2 sec  Fibrinogen:   Lactate:   MEDICATIONS  (STANDING):  ketorolac   Injectable 30 milliGRAM(s) IV Push once  oxyCODONE    IR 5 milliGRAM(s) Oral every 3 hours  sodium chloride 0.9% lock flush 3 milliLiter(s) IV Push every 8 hours  diphtheria/tetanus/pertussis (acellular) Vaccine (ADAcel) 0.5 milliLiter(s) IntraMuscular once  oxytocin Infusion 41.667 milliUNIT(s)/Min (125 mL/Hr) IV Continuous <Continuous>  prenatal multivitamin 1 Tablet(s) Oral daily  acetaminophen   Tablet. 975 milliGRAM(s) Oral every 6 hours  ibuprofen  Tablet 600 milliGRAM(s) Oral every 6 hours  lactated ringers Bolus 2000 milliLiter(s) IV Bolus once  methylergonovine 0.2 milliGRAM(s) Oral every 4 hours      Evaluation :  32yoF  PPD#1 from vacuum assisted vaginal delivery complicated by PPH with total EBL 1250. Continued drop in H&H. Pt counseled on transfusion and is declining transfusion at present. Pt to be seen by Dr. Harding who is en route to the hospital.        Libby Briscoe MD PGY1

## 2017-09-11 LAB
HCT VFR BLD CALC: 25.6 % — LOW (ref 34.5–45)
HGB BLD-MCNC: 8.4 G/DL — LOW (ref 11.5–15.5)
MCHC RBC-ENTMCNC: 28.3 PG — SIGNIFICANT CHANGE UP (ref 27–34)
MCHC RBC-ENTMCNC: 33 GM/DL — SIGNIFICANT CHANGE UP (ref 32–36)
MCV RBC AUTO: 85.7 FL — SIGNIFICANT CHANGE UP (ref 80–100)
PLATELET # BLD AUTO: 142 K/UL — LOW (ref 150–400)
RBC # BLD: 2.98 M/UL — LOW (ref 3.8–5.2)
RBC # FLD: 17.1 % — HIGH (ref 10.3–14.5)
WBC # BLD: 14.8 K/UL — HIGH (ref 3.8–10.5)
WBC # FLD AUTO: 14.8 K/UL — HIGH (ref 3.8–10.5)

## 2017-09-11 RX ADMIN — Medication 975 MILLIGRAM(S): at 06:16

## 2017-09-11 RX ADMIN — Medication 975 MILLIGRAM(S): at 00:39

## 2017-09-11 RX ADMIN — Medication 975 MILLIGRAM(S): at 14:17

## 2017-09-11 RX ADMIN — Medication 975 MILLIGRAM(S): at 00:09

## 2017-09-11 RX ADMIN — SODIUM CHLORIDE 3 MILLILITER(S): 9 INJECTION INTRAMUSCULAR; INTRAVENOUS; SUBCUTANEOUS at 14:54

## 2017-09-11 RX ADMIN — SODIUM CHLORIDE 3 MILLILITER(S): 9 INJECTION INTRAMUSCULAR; INTRAVENOUS; SUBCUTANEOUS at 06:18

## 2017-09-11 RX ADMIN — Medication 1 TABLET(S): at 11:06

## 2017-09-11 RX ADMIN — Medication 975 MILLIGRAM(S): at 06:46

## 2017-09-11 RX ADMIN — Medication 100 MILLIGRAM(S): at 11:05

## 2017-09-12 ENCOUNTER — TRANSCRIPTION ENCOUNTER (OUTPATIENT)
Age: 32
End: 2017-09-12

## 2017-09-12 VITALS
HEART RATE: 81 BPM | SYSTOLIC BLOOD PRESSURE: 115 MMHG | OXYGEN SATURATION: 100 % | RESPIRATION RATE: 18 BRPM | TEMPERATURE: 98 F | DIASTOLIC BLOOD PRESSURE: 73 MMHG

## 2017-09-12 DIAGNOSIS — D50.0 IRON DEFICIENCY ANEMIA SECONDARY TO BLOOD LOSS (CHRONIC): ICD-10-CM

## 2017-09-12 LAB
HCT VFR BLD CALC: 26.6 % — LOW (ref 34.5–45)
HGB BLD-MCNC: 9.1 G/DL — LOW (ref 11.5–15.5)
MCHC RBC-ENTMCNC: 29.5 PG — SIGNIFICANT CHANGE UP (ref 27–34)
MCHC RBC-ENTMCNC: 34.2 GM/DL — SIGNIFICANT CHANGE UP (ref 32–36)
MCV RBC AUTO: 86.2 FL — SIGNIFICANT CHANGE UP (ref 80–100)
PLATELET # BLD AUTO: 236 K/UL — SIGNIFICANT CHANGE UP (ref 150–400)
RBC # BLD: 3.09 M/UL — LOW (ref 3.8–5.2)
RBC # FLD: 17.1 % — HIGH (ref 10.3–14.5)
WBC # BLD: 14.1 K/UL — HIGH (ref 3.8–10.5)
WBC # FLD AUTO: 14.1 K/UL — HIGH (ref 3.8–10.5)

## 2017-09-12 PROCEDURE — 59050 FETAL MONITOR W/REPORT: CPT

## 2017-09-12 PROCEDURE — 86901 BLOOD TYPING SEROLOGIC RH(D): CPT

## 2017-09-12 PROCEDURE — 36430 TRANSFUSION BLD/BLD COMPNT: CPT

## 2017-09-12 PROCEDURE — 90707 MMR VACCINE SC: CPT

## 2017-09-12 PROCEDURE — 85610 PROTHROMBIN TIME: CPT

## 2017-09-12 PROCEDURE — 85027 COMPLETE CBC AUTOMATED: CPT

## 2017-09-12 PROCEDURE — 86900 BLOOD TYPING SEROLOGIC ABO: CPT

## 2017-09-12 PROCEDURE — 85018 HEMOGLOBIN: CPT

## 2017-09-12 PROCEDURE — 86850 RBC ANTIBODY SCREEN: CPT

## 2017-09-12 PROCEDURE — P9016: CPT

## 2017-09-12 PROCEDURE — 85730 THROMBOPLASTIN TIME PARTIAL: CPT

## 2017-09-12 PROCEDURE — 86923 COMPATIBILITY TEST ELECTRIC: CPT

## 2017-09-12 PROCEDURE — G0463: CPT

## 2017-09-12 PROCEDURE — 86780 TREPONEMA PALLIDUM: CPT

## 2017-09-12 PROCEDURE — 59025 FETAL NON-STRESS TEST: CPT

## 2017-09-12 PROCEDURE — 80048 BASIC METABOLIC PNL TOTAL CA: CPT

## 2017-09-12 RX ORDER — ACETAMINOPHEN 500 MG
3 TABLET ORAL
Qty: 0 | Refills: 0 | DISCHARGE
Start: 2017-09-12

## 2017-09-12 RX ORDER — IBUPROFEN 200 MG
1 TABLET ORAL
Qty: 0 | Refills: 0 | DISCHARGE
Start: 2017-09-12

## 2017-09-12 RX ADMIN — Medication 975 MILLIGRAM(S): at 12:30

## 2017-09-12 RX ADMIN — Medication 600 MILLIGRAM(S): at 00:34

## 2017-09-12 RX ADMIN — Medication 0.5 MILLILITER(S): at 12:00

## 2017-09-12 RX ADMIN — Medication 975 MILLIGRAM(S): at 12:00

## 2017-09-12 RX ADMIN — Medication 600 MILLIGRAM(S): at 01:00

## 2017-09-12 RX ADMIN — Medication 100 MILLIGRAM(S): at 07:11

## 2017-09-12 RX ADMIN — Medication 600 MILLIGRAM(S): at 12:30

## 2017-09-12 RX ADMIN — Medication 600 MILLIGRAM(S): at 11:59

## 2017-09-12 RX ADMIN — Medication 600 MILLIGRAM(S): at 18:14

## 2017-09-12 RX ADMIN — Medication 100 MILLIGRAM(S): at 12:00

## 2017-09-12 RX ADMIN — SODIUM CHLORIDE 3 MILLILITER(S): 9 INJECTION INTRAMUSCULAR; INTRAVENOUS; SUBCUTANEOUS at 00:00

## 2017-09-12 RX ADMIN — Medication 600 MILLIGRAM(S): at 18:45

## 2017-09-12 RX ADMIN — Medication 1 TABLET(S): at 12:07

## 2017-09-12 RX ADMIN — MAGNESIUM HYDROXIDE 30 MILLILITER(S): 400 TABLET, CHEWABLE ORAL at 18:14

## 2017-09-12 NOTE — PROGRESS NOTE ADULT - ASSESSMENT
33y/o  PPD#1 from  in stable condition. +750 PPH, s/p 2UPRBC. Hct: 40.2->20.2->2UPRBC->28.
A/P: 31yo  PPD#3 s/p VAVD.   Postpartum course c/b postpartum hemorrhage and secondary anemia for which pt received blood transfusion. Patient is stable and doing well post-partum.

## 2017-09-12 NOTE — DISCHARGE NOTE OB - INSTRUCTIONS
Any concerns in temperature or bleeding call MD or go to Emergency Department, [facility] fact sheet given.

## 2017-09-12 NOTE — PROGRESS NOTE ADULT - SUBJECTIVE AND OBJECTIVE BOX
Patient seen and examined at bedside, no acute overnight events. No acute complaints, pain well controlled. Patient is ambulating, voiding spontaneously, passing gas, and tolerating regular diet. Denies nausea, vomiting SOB, CP, dizziness, syncope, palpitations headache, fevers, chills.     Vital Signs Last 24 Hours  T(C): 36.7 (09-11-17 @ 00:40), Max: 37.9 (09-10-17 @ 10:55)  HR: 74 (09-11-17 @ 00:40) (74 - 104)  BP: 111/71 (09-11-17 @ 00:40) (106/64 - 127/77)  RR: 18 (09-11-17 @ 00:40) (18 - 26)  SpO2: 99% (09-11-17 @ 00:40) (96% - 99%)    Physical Exam:  General: NAD  Abdomen: Soft, non-tender, non-distended, fundus firm  Pelvic: Lochia wnl    Labs:    Blood Type: O Positive  Antibody Screen: --  RPR: Negative               10.1   17.7  )-----------( 152      ( 09-10 @ 22:33 )             28.9                6.9    x     )-----------( x        ( 09-10 @ 06:29 )             20.2                7.9    18.8  )-----------( 107      ( 09-09 @ 21:02 )             22.8         MEDICATIONS  (STANDING):  ketorolac   Injectable 30 milliGRAM(s) IV Push once  oxyCODONE    IR 5 milliGRAM(s) Oral every 3 hours  sodium chloride 0.9% lock flush 3 milliLiter(s) IV Push every 8 hours  diphtheria/tetanus/pertussis (acellular) Vaccine (ADAcel) 0.5 milliLiter(s) IntraMuscular once  oxytocin Infusion 41.667 milliUNIT(s)/Min (125 mL/Hr) IV Continuous <Continuous>  prenatal multivitamin 1 Tablet(s) Oral daily  acetaminophen   Tablet. 975 milliGRAM(s) Oral every 6 hours  ibuprofen  Tablet 600 milliGRAM(s) Oral every 6 hours  lactated ringers Bolus 2000 milliLiter(s) IV Bolus once  diphenhydrAMINE   Capsule 25 milliGRAM(s) Oral once    MEDICATIONS  (PRN):  oxyCODONE    IR 5 milliGRAM(s) Oral every 4 hours PRN Severe Pain (7 -10)  hydrocortisone 1% Cream 1 Application(s) Topical every 4 hours PRN Moderate to Severe Perineal Pain  pramoxine 1%/zinc 5% Cream 1 Application(s) Topical every 4 hours PRN Moderate to Severe Perineal Pain  dibucaine 1% Ointment 1 Application(s) Topical every 4 hours PRN Perineal Discomfort  lanolin Ointment 1 Application(s) Topical every 6 hours PRN Sore Nipples  witch hazel Pads 1 Application(s) Topical every 4 hours PRN Perineal Discomfort  simethicone 80 milliGRAM(s) Chew every 6 hours PRN Gas  diphenhydrAMINE   Capsule 25 milliGRAM(s) Oral every 6 hours PRN Itching  glycerin Suppository - Adult 1 Suppository(s) Rectal at bedtime PRN Constipation  magnesium hydroxide Suspension 30 milliLiter(s) Oral two times a day PRN Constipation  docusate sodium 100 milliGRAM(s) Oral two times a day PRN Stool Softening
Comfortable and stable for discharge home pending f/u cbc remains stable.  Discharge instructions given to the patient.  MSP
No complaints  Stable overnight in RR, minimal bleeding  Vital Signs Last 24 Hrs  T(C): 37.2 (10 Sep 2017 09:11), Max: 37.9 (09 Sep 2017 13:50)  T(F): 99 (10 Sep 2017 09:11), Max: 100.2 (09 Sep 2017 13:50)  HR: 92 (10 Sep 2017 09:11) (70 - 118)  BP: 121/59 (10 Sep 2017 09:11) (101/62 - 137/80)  BP(mean): 85 (10 Sep 2017 09:11) (84 - 103)  RR: 24 (10 Sep 2017 09:11) (17 - 26)  SpO2: 96% (10 Sep 2017 09:11) (96% - 100%)    PHYSICAL EXAM:      Constitutional: no acute distress    Gastrointestinal: Abd. soft, non-tender, +BS    Lochia- nl    Genitourinary: voiding      Extremities: non-tender    Impression: PPD #1-- s/p PPH- responded to medication.     Hct now 20.     Plan: Rec. transfusion of 2u PRBC's- all reasons, R/B disc. in detail. Pt. and  agree w/ plan.           -IVET Harding MD
OB Progress Note: VAVD PPD#3    S: 31yo  PPD#3 s/p VAVD. Patient feels well. Pain is well controlled. She is tolerating a regular diet and passing flatus. She is voiding spontaneously, and ambulating without difficulty. Denies CP/SOB. Denies lightheadedness/dizziness. Denies N/V.    O:  Vitals:   Vital Signs Last 24 Hrs  T(C): 37 (12 Sep 2017 05:00), Max: 37.2 (11 Sep 2017 21:39)  T(F): 98.6 (12 Sep 2017 05:00), Max: 99 (11 Sep 2017 21:39)  HR: 90 (12 Sep 2017 05:00) (78 - 97)  BP: 120/89 (12 Sep 2017 05:00) (105/68 - 121/77)  BP(mean): --  RR: 18 (12 Sep 2017 05:00) (16 - 18)  SpO2: 100% (12 Sep 2017 00:30) (96% - 100%)    MEDICATIONS  (STANDING):  ketorolac   Injectable 30 milliGRAM(s) IV Push once  oxyCODONE    IR 5 milliGRAM(s) Oral every 3 hours  sodium chloride 0.9% lock flush 3 milliLiter(s) IV Push every 8 hours  diphtheria/tetanus/pertussis (acellular) Vaccine (ADAcel) 0.5 milliLiter(s) IntraMuscular once  oxytocin Infusion 41.667 milliUNIT(s)/Min (125 mL/Hr) IV Continuous <Continuous>  prenatal multivitamin 1 Tablet(s) Oral daily  acetaminophen   Tablet. 975 milliGRAM(s) Oral every 6 hours  ibuprofen  Tablet 600 milliGRAM(s) Oral every 6 hours  lactated ringers Bolus 2000 milliLiter(s) IV Bolus once  diphenhydrAMINE   Capsule 25 milliGRAM(s) Oral once  measles/mumps/rubella Vaccine 0.5 milliLiter(s) SubCutaneous once    MEDICATIONS  (PRN):  oxyCODONE    IR 5 milliGRAM(s) Oral every 4 hours PRN Severe Pain (7 -10)  hydrocortisone 1% Cream 1 Application(s) Topical every 4 hours PRN Moderate to Severe Perineal Pain  pramoxine 1%/zinc 5% Cream 1 Application(s) Topical every 4 hours PRN Moderate to Severe Perineal Pain  dibucaine 1% Ointment 1 Application(s) Topical every 4 hours PRN Perineal Discomfort  lanolin Ointment 1 Application(s) Topical every 6 hours PRN Sore Nipples  witch hazel Pads 1 Application(s) Topical every 4 hours PRN Perineal Discomfort  simethicone 80 milliGRAM(s) Chew every 6 hours PRN Gas  diphenhydrAMINE   Capsule 25 milliGRAM(s) Oral every 6 hours PRN Itching  glycerin Suppository - Adult 1 Suppository(s) Rectal at bedtime PRN Constipation  magnesium hydroxide Suspension 30 milliLiter(s) Oral two times a day PRN Constipation  docusate sodium 100 milliGRAM(s) Oral two times a day PRN Stool Softening      Labs:  Blood type: O Positive  Rubella IgG: RPR: Negative                          8.4<L>   14.8<H> >-----------< 142<L>    (  @ 06:39 )             25.6<L>                        10.1<L>   17.7<H> >-----------< 152    ( 09-10 @ 22:33 )             28.9<L>                        6.9<LL>   -- >-----------< --    ( 09-10 @ 06:29 )             20.2<LL>                        7.9<L>   18.8<H> >-----------< 107<L>    (  @ 21:02 )             22.8<L>                        7.8<L>   19.8<H> >-----------< 119<L>    (  @ 17:19 )             22.3<L>    17 @ 18:07      139  |  106  |  7   ----------------------------<  123<H>  4.5   |  20<L>  |  0.81        Ca    8.4      09 Sep 2017 18:07      Physical Exam:  General: NAD  Abdomen: soft, non-tender, non-distended, fundus firm  Vaginal: Lochia wnl  Extremities: NTBL
Pt. has had some episodes of increased vaginal bleeding since delivery. Uterus has been atonic. She received Cytotec 1000 mcg previously with some benefit. Bleeding later increased and she was transferred back to L&D RR. She has received Methergine 0.2mg IM, Hemabate 250 IM, TXM 1300 mg. Rodriguez catheter was placed and 850cc was obtained. Fundus is now very firm with no evident vaginal bleeding. Her vital signs have been hemodynamically stable throughout the day. H/H at 5:30 was 7.8/22.3.    P: Will observe overnight in RR. Possible need for transfusion discussed with patient and . I have also discussed possible need for OR intervention w/ D&C, Bakri balloon placement or more invasive intervention. Pt. and  understand and agree w/ plan.    IVET Harding MD

## 2017-09-12 NOTE — DISCHARGE NOTE OB - MATERIALS PROVIDED
Birth Certificate Instructions/Breastfeeding Mother’s Support Group Information/Guide to Postpartum Care/Breastfeeding Guide and Packet/Bottle Feeding Log/[facility] Hearing Screen Program/Back To Sleep Handout/Vaccinations/[facility]  Screening Program/Discharge Medication Information for Patients and Families Pocket Guide/[place]  Immunization Record/Breastfeeding Log/Shaken Baby Prevention Handout

## 2017-09-12 NOTE — DISCHARGE NOTE OB - CARE PLAN
Principal Discharge DX:	Vaginal delivery  Goal:	return to normal activityq  Instructions for follow-up, activity and diet:	diet and activity as tolerated. follow up with dr. harrington in 6 weeks

## 2017-09-12 NOTE — DISCHARGE NOTE OB - CARE PROVIDER_API CALL
Aneta Bee), Obstetrics and Gynecology  [address] [place] [address] [address]  [place] [address]  Phone: [phone]  Fax: [phone]

## 2017-09-12 NOTE — DISCHARGE NOTE OB - PATIENT PORTAL LINK FT
“You can access the [employer] Patient Portal, offered by [facility], by registering with the following website: [email or web address]”

## 2018-07-20 NOTE — DISCHARGE NOTE OB - PERSISTENT HEADACHE, BLURRED VISION

## 2019-03-07 PROBLEM — Z00.00 ENCOUNTER FOR PREVENTIVE HEALTH EXAMINATION: Status: ACTIVE | Noted: 2019-03-07

## 2019-04-04 ENCOUNTER — APPOINTMENT (OUTPATIENT)
Dept: ANTEPARTUM | Facility: CLINIC | Age: 34
End: 2019-04-04
Payer: COMMERCIAL

## 2019-04-04 ENCOUNTER — ASOB RESULT (OUTPATIENT)
Age: 34
End: 2019-04-04

## 2019-04-04 PROCEDURE — 36416 COLLJ CAPILLARY BLOOD SPEC: CPT

## 2019-04-04 PROCEDURE — 76801 OB US < 14 WKS SINGLE FETUS: CPT

## 2019-04-04 PROCEDURE — 76813 OB US NUCHAL MEAS 1 GEST: CPT

## 2019-05-28 ENCOUNTER — ASOB RESULT (OUTPATIENT)
Age: 34
End: 2019-05-28

## 2019-05-28 ENCOUNTER — APPOINTMENT (OUTPATIENT)
Dept: ANTEPARTUM | Facility: CLINIC | Age: 34
End: 2019-05-28
Payer: COMMERCIAL

## 2019-05-28 PROCEDURE — 76811 OB US DETAILED SNGL FETUS: CPT

## 2019-09-30 ENCOUNTER — INPATIENT (INPATIENT)
Facility: HOSPITAL | Age: 34
LOS: 2 days | Discharge: HOME | End: 2019-10-03
Attending: OBSTETRICS & GYNECOLOGY | Admitting: OBSTETRICS & GYNECOLOGY

## 2019-09-30 VITALS
HEART RATE: 61 BPM | DIASTOLIC BLOOD PRESSURE: 63 MMHG | TEMPERATURE: 98 F | RESPIRATION RATE: 61 BRPM | SYSTOLIC BLOOD PRESSURE: 106 MMHG

## 2019-09-30 DIAGNOSIS — Z3A.00 WEEKS OF GESTATION OF PREGNANCY NOT SPECIFIED: ICD-10-CM

## 2019-09-30 DIAGNOSIS — Z90.49 ACQUIRED ABSENCE OF OTHER SPECIFIED PARTS OF DIGESTIVE TRACT: Chronic | ICD-10-CM

## 2019-09-30 DIAGNOSIS — O26.899 OTHER SPECIFIED PREGNANCY RELATED CONDITIONS, UNSPECIFIED TRIMESTER: ICD-10-CM

## 2019-09-30 LAB
ALBUMIN SERPL ELPH-MCNC: 3.4 G/DL — SIGNIFICANT CHANGE UP (ref 3.3–5)
ALP SERPL-CCNC: 401 U/L — HIGH (ref 40–120)
ALT FLD-CCNC: 51 U/L — HIGH (ref 4–33)
ANION GAP SERPL CALC-SCNC: 15 MMO/L — HIGH (ref 7–14)
ANISOCYTOSIS BLD QL: SIGNIFICANT CHANGE UP
AST SERPL-CCNC: 41 U/L — HIGH (ref 4–32)
BASOPHILS # BLD AUTO: 0.05 K/UL — SIGNIFICANT CHANGE UP (ref 0–0.2)
BASOPHILS NFR BLD AUTO: 0.7 % — SIGNIFICANT CHANGE UP (ref 0–2)
BASOPHILS NFR SPEC: 2.4 % — HIGH (ref 0–2)
BILIRUB SERPL-MCNC: 0.8 MG/DL — SIGNIFICANT CHANGE UP (ref 0.2–1.2)
BLASTS # FLD: 0 % — SIGNIFICANT CHANGE UP (ref 0–0)
BLD GP AB SCN SERPL QL: NEGATIVE — SIGNIFICANT CHANGE UP
BUN SERPL-MCNC: 10 MG/DL — SIGNIFICANT CHANGE UP (ref 7–23)
CALCIUM SERPL-MCNC: 9.2 MG/DL — SIGNIFICANT CHANGE UP (ref 8.4–10.5)
CHLORIDE SERPL-SCNC: 104 MMOL/L — SIGNIFICANT CHANGE UP (ref 98–107)
CO2 SERPL-SCNC: 18 MMOL/L — LOW (ref 22–31)
CREAT SERPL-MCNC: 0.57 MG/DL — SIGNIFICANT CHANGE UP (ref 0.5–1.3)
EOSINOPHIL # BLD AUTO: 0.06 K/UL — SIGNIFICANT CHANGE UP (ref 0–0.5)
EOSINOPHIL NFR BLD AUTO: 0.8 % — SIGNIFICANT CHANGE UP (ref 0–6)
EOSINOPHIL NFR FLD: 1.6 % — SIGNIFICANT CHANGE UP (ref 0–6)
GIANT PLATELETS BLD QL SMEAR: PRESENT — SIGNIFICANT CHANGE UP
GLUCOSE SERPL-MCNC: 130 MG/DL — HIGH (ref 70–99)
HCT VFR BLD CALC: 34.9 % — SIGNIFICANT CHANGE UP (ref 34.5–45)
HGB BLD-MCNC: 10.5 G/DL — LOW (ref 11.5–15.5)
HYPOCHROMIA BLD QL: SLIGHT — SIGNIFICANT CHANGE UP
IMM GRANULOCYTES NFR BLD AUTO: 0.4 % — SIGNIFICANT CHANGE UP (ref 0–1.5)
LYMPHOCYTES # BLD AUTO: 1.63 K/UL — SIGNIFICANT CHANGE UP (ref 1–3.3)
LYMPHOCYTES # BLD AUTO: 22.8 % — SIGNIFICANT CHANGE UP (ref 13–44)
LYMPHOCYTES NFR SPEC AUTO: 29.1 % — SIGNIFICANT CHANGE UP (ref 13–44)
MCHC RBC-ENTMCNC: 20.7 PG — LOW (ref 27–34)
MCHC RBC-ENTMCNC: 30.1 % — LOW (ref 32–36)
MCV RBC AUTO: 68.7 FL — LOW (ref 80–100)
METAMYELOCYTES # FLD: 0 % — SIGNIFICANT CHANGE UP (ref 0–1)
MICROCYTES BLD QL: SIGNIFICANT CHANGE UP
MONOCYTES # BLD AUTO: 0.32 K/UL — SIGNIFICANT CHANGE UP (ref 0–0.9)
MONOCYTES NFR BLD AUTO: 4.5 % — SIGNIFICANT CHANGE UP (ref 2–14)
MONOCYTES NFR BLD: 0.8 % — LOW (ref 2–9)
MYELOCYTES NFR BLD: 0 % — SIGNIFICANT CHANGE UP (ref 0–0)
NEUTROPHIL AB SER-ACNC: 62.9 % — SIGNIFICANT CHANGE UP (ref 43–77)
NEUTROPHILS # BLD AUTO: 5.07 K/UL — SIGNIFICANT CHANGE UP (ref 1.8–7.4)
NEUTROPHILS NFR BLD AUTO: 70.8 % — SIGNIFICANT CHANGE UP (ref 43–77)
NEUTS BAND # BLD: 0 % — SIGNIFICANT CHANGE UP (ref 0–6)
NRBC # FLD: 0 K/UL — SIGNIFICANT CHANGE UP (ref 0–0)
OTHER - HEMATOLOGY %: 0 — SIGNIFICANT CHANGE UP
PLATELET # BLD AUTO: 238 K/UL — SIGNIFICANT CHANGE UP (ref 150–400)
PLATELET COUNT - ESTIMATE: NORMAL — SIGNIFICANT CHANGE UP
PMV BLD: 11.7 FL — SIGNIFICANT CHANGE UP (ref 7–13)
POIKILOCYTOSIS BLD QL AUTO: SLIGHT — SIGNIFICANT CHANGE UP
POLYCHROMASIA BLD QL SMEAR: SLIGHT — SIGNIFICANT CHANGE UP
POTASSIUM SERPL-MCNC: 4 MMOL/L — SIGNIFICANT CHANGE UP (ref 3.5–5.3)
POTASSIUM SERPL-SCNC: 4 MMOL/L — SIGNIFICANT CHANGE UP (ref 3.5–5.3)
PROMYELOCYTES # FLD: 0 % — SIGNIFICANT CHANGE UP (ref 0–0)
PROT SERPL-MCNC: 6.9 G/DL — SIGNIFICANT CHANGE UP (ref 6–8.3)
RBC # BLD: 5.08 M/UL — SIGNIFICANT CHANGE UP (ref 3.8–5.2)
RBC # FLD: 17.4 % — HIGH (ref 10.3–14.5)
RH IG SCN BLD-IMP: POSITIVE — SIGNIFICANT CHANGE UP
SODIUM SERPL-SCNC: 137 MMOL/L — SIGNIFICANT CHANGE UP (ref 135–145)
VARIANT LYMPHS # BLD: 3.2 % — SIGNIFICANT CHANGE UP
WBC # BLD: 7.16 K/UL — SIGNIFICANT CHANGE UP (ref 3.8–10.5)
WBC # FLD AUTO: 7.16 K/UL — SIGNIFICANT CHANGE UP (ref 3.8–10.5)

## 2019-09-30 RX ORDER — AMPICILLIN TRIHYDRATE 250 MG
1 CAPSULE ORAL EVERY 4 HOURS
Refills: 0 | Status: DISCONTINUED | OUTPATIENT
Start: 2019-09-30 | End: 2019-10-01

## 2019-09-30 RX ORDER — AMPICILLIN TRIHYDRATE 250 MG
2 CAPSULE ORAL ONCE
Refills: 0 | Status: COMPLETED | OUTPATIENT
Start: 2019-09-30 | End: 2019-09-30

## 2019-09-30 RX ORDER — OXYTOCIN 10 UNIT/ML
333.33 VIAL (ML) INJECTION
Qty: 20 | Refills: 0 | Status: DISCONTINUED | OUTPATIENT
Start: 2019-09-30 | End: 2019-09-30

## 2019-09-30 RX ORDER — SODIUM CHLORIDE 9 MG/ML
1000 INJECTION, SOLUTION INTRAVENOUS
Refills: 0 | Status: DISCONTINUED | OUTPATIENT
Start: 2019-09-30 | End: 2019-09-30

## 2019-09-30 RX ORDER — SODIUM CHLORIDE 9 MG/ML
1000 INJECTION, SOLUTION INTRAVENOUS
Refills: 0 | Status: DISCONTINUED | OUTPATIENT
Start: 2019-09-30 | End: 2019-10-01

## 2019-09-30 RX ORDER — INFLUENZA VIRUS VACCINE 15; 15; 15; 15 UG/.5ML; UG/.5ML; UG/.5ML; UG/.5ML
0.5 SUSPENSION INTRAMUSCULAR ONCE
Refills: 0 | Status: DISCONTINUED | OUTPATIENT
Start: 2019-09-30 | End: 2019-10-03

## 2019-09-30 RX ORDER — AMPICILLIN TRIHYDRATE 250 MG
CAPSULE ORAL
Refills: 0 | Status: DISCONTINUED | OUTPATIENT
Start: 2019-09-30 | End: 2019-10-01

## 2019-09-30 RX ORDER — OXYTOCIN 10 UNIT/ML
333.33 VIAL (ML) INJECTION
Qty: 20 | Refills: 0 | Status: DISCONTINUED | OUTPATIENT
Start: 2019-09-30 | End: 2019-10-01

## 2019-09-30 RX ADMIN — Medication 108 GRAM(S): at 22:07

## 2019-09-30 RX ADMIN — SODIUM CHLORIDE 125 MILLILITER(S): 9 INJECTION, SOLUTION INTRAVENOUS at 19:15

## 2019-09-30 RX ADMIN — Medication 216 GRAM(S): at 18:16

## 2019-09-30 NOTE — OB PROVIDER H&P - PROBLEM SELECTOR PLAN 1
Induction of labor due to increased suspicion for cholestasis.   - admit to labor and delivery  - for oral cytotec and cervical balloon

## 2019-09-30 NOTE — OB PROVIDER IHI INDUCTION/AUGMENTATION NOTE - NS_CHECKALL_OBGYN_ALL_OB
Induction / Augmentation was discussed/Order was written/Contractions pattern was reviewed/FHR was reviewed/H&P was completed

## 2019-09-30 NOTE — OB PROVIDER TRIAGE NOTE - NSOBPROVIDERNOTE_OBGYN_ALL_OB_FT
's patient is a 35 y/o EGA 10/15/2019 EDC 37 6/7  sent from office for induction of labor due to presuming cholestasis. Patient reports of fetal movement. Denies loss of fluid, vaginal bleeding.     AP complications: Reports of feeling itchy throughout her body for 2 weeks. Patient reports her liver enzymes in the office was elevated  Medical History: Deies  Surgical History: Appendectomy  OBGYN History:  - 2017  9-3 male, complicated by PPH, s/p PRBC x 2    Vital Signs Last 24 Hrs  T(C): 37.0 (30 Sep 2019 16:41), Max: 37.0 (30 Sep 2019 16:41)  T(F): 98.6 (30 Sep 2019 16:41), Max: 98.6 (30 Sep 2019 16:41)  HR: 61 (30 Sep 2019 16:43) (61 - 61)  BP: 106/63 (30 Sep 2019 16:43) (106/63 - 106/63)  RR: 61 (30 Sep 2019 15:54) (61 - 61)  FHR:  130 baseline with accelerations, no decelerations, moderate variability  CTX: irregular   SVE: 2/50/-3    Induction of labor due to increased suspicion for cholestasis.   - admit to labor and delivery  - for oral cytotec and cervical balloon

## 2019-09-30 NOTE — OB PROVIDER TRIAGE NOTE - NSHPPHYSICALEXAM_GEN_ALL_CORE
Vital Signs Last 24 Hrs  T(C): 37.0 (30 Sep 2019 16:41), Max: 37.0 (30 Sep 2019 16:41)  T(F): 98.6 (30 Sep 2019 16:41), Max: 98.6 (30 Sep 2019 16:41)  HR: 61 (30 Sep 2019 16:43) (61 - 61)  BP: 106/63 (30 Sep 2019 16:43) (106/63 - 106/63)  RR: 61 (30 Sep 2019 15:54) (61 - 61)  FHR:  130 baseline with accelerations, no decelerations, moderate variability  CTX: irregular   SVE: 2/50/-3

## 2019-09-30 NOTE — OB PROVIDER H&P - ASSESSMENT
's patient is a 35 y/o EGA 10/15/2019 EDC 37 6/7  sent from office for induction of labor due to presuming cholestasis. Patient reports of fetal movement. Denies loss of fluid, vaginal bleeding.     AP complications: Reports of feeling itchy throughout her body for 2 weeks. Patient reports her liver enzymes in the office was elevated  Medical History: Deies  Surgical History: Appendectomy  OBGYN History:  - 2017  9-3 male, complicated by PPH, s/p PRBC x 2    Vital Signs Last 24 Hrs  T(C): 37.0 (30 Sep 2019 16:41), Max: 37.0 (30 Sep 2019 16:41)  T(F): 98.6 (30 Sep 2019 16:41), Max: 98.6 (30 Sep 2019 16:41)  HR: 61 (30 Sep 2019 16:43) (61 - 61)  BP: 106/63 (30 Sep 2019 16:43) (106/63 - 106/63)  RR: 61 (30 Sep 2019 15:54) (61 - 61)  FHR:  130 baseline with accelerations, no decelerations, moderate variability  CTX: irregular   SVE: 2/50/-3  EFW 3629    Induction of labor due to increased suspicion for cholestasis.   - admit to labor and delivery  - for oral cytotec and cervical balloon

## 2019-09-30 NOTE — OB PROVIDER TRIAGE NOTE - HISTORY OF PRESENT ILLNESS
's patient is a 35 y/o EGA 10/15/2019 EDC 37 6/7  sent from office for induction of labor due to presuming cholestasis. Patient reports of fetal movement. Denies loss of fluid, vaginal bleeding.     AP complications: Reports of feeling itchy throughout her body for 2 weeks. Patient reports her liver enzymes in the office was elevated  Medical History: Deies  Surgical History: Appendectomy  OBGYN History:  - 2017  9-3 male, complicated by PPH, s/p PRBC x 2 's patient is a 33 y/o EGA 10/15/2019 EDC 37 6/7  sent from office for induction of labor due to presuming cholestasis. Patient reports of fetal movement. Denies loss of fluid, vaginal bleeding. GBS status: Positive 2019    AP complications: Reports of feeling itchy throughout her body for 2 weeks. Patient reports her liver enzymes in the office was elevated  Medical History: Deies  Surgical History: Appendectomy  OBGYN History:  - 2017  9-3 male, complicated by PPH, s/p PRBC x 2

## 2019-09-30 NOTE — OB PROVIDER H&P - HISTORY OF PRESENT ILLNESS
's patient is a 35 y/o EGA 10/15/2019 EDC 37 6/7  sent from office for induction of labor due to presuming cholestasis. Patient reports of fetal movement. Denies loss of fluid, vaginal bleeding.     AP complications: Reports of feeling itchy throughout her body for 2 weeks. Patient reports her liver enzymes in the office was elevated  Medical History: Deies  Surgical History: Appendectomy  OBGYN History:  - 2017  9-3 male, complicated by PPH, s/p PRBC x 2

## 2019-10-01 ENCOUNTER — TRANSCRIPTION ENCOUNTER (OUTPATIENT)
Age: 34
End: 2019-10-01

## 2019-10-01 LAB — T PALLIDUM AB TITR SER: NEGATIVE — SIGNIFICANT CHANGE UP

## 2019-10-01 RX ORDER — TETANUS TOXOID, REDUCED DIPHTHERIA TOXOID AND ACELLULAR PERTUSSIS VACCINE, ADSORBED 5; 2.5; 8; 8; 2.5 [IU]/.5ML; [IU]/.5ML; UG/.5ML; UG/.5ML; UG/.5ML
0.5 SUSPENSION INTRAMUSCULAR ONCE
Refills: 0 | Status: COMPLETED | OUTPATIENT
Start: 2019-10-01

## 2019-10-01 RX ORDER — DOCUSATE SODIUM 100 MG
100 CAPSULE ORAL
Refills: 0 | Status: DISCONTINUED | OUTPATIENT
Start: 2019-10-01 | End: 2019-10-03

## 2019-10-01 RX ORDER — OXYTOCIN 10 UNIT/ML
333.33 VIAL (ML) INJECTION
Qty: 20 | Refills: 0 | Status: DISCONTINUED | OUTPATIENT
Start: 2019-10-01 | End: 2019-10-01

## 2019-10-01 RX ORDER — LANOLIN
1 OINTMENT (GRAM) TOPICAL EVERY 6 HOURS
Refills: 0 | Status: DISCONTINUED | OUTPATIENT
Start: 2019-10-01 | End: 2019-10-03

## 2019-10-01 RX ORDER — SODIUM CHLORIDE 9 MG/ML
1000 INJECTION, SOLUTION INTRAVENOUS
Refills: 0 | Status: DISCONTINUED | OUTPATIENT
Start: 2019-10-01 | End: 2019-10-01

## 2019-10-01 RX ORDER — IBUPROFEN 200 MG
600 TABLET ORAL EVERY 6 HOURS
Refills: 0 | Status: DISCONTINUED | OUTPATIENT
Start: 2019-10-01 | End: 2019-10-03

## 2019-10-01 RX ORDER — DIBUCAINE 1 %
1 OINTMENT (GRAM) RECTAL EVERY 6 HOURS
Refills: 0 | Status: DISCONTINUED | OUTPATIENT
Start: 2019-10-01 | End: 2019-10-03

## 2019-10-01 RX ORDER — IBUPROFEN 200 MG
600 TABLET ORAL EVERY 6 HOURS
Refills: 0 | Status: COMPLETED | OUTPATIENT
Start: 2019-10-01 | End: 2020-08-29

## 2019-10-01 RX ORDER — DIPHENHYDRAMINE HCL 50 MG
50 CAPSULE ORAL ONCE
Refills: 0 | Status: COMPLETED | OUTPATIENT
Start: 2019-10-01 | End: 2019-10-01

## 2019-10-01 RX ORDER — DIPHENHYDRAMINE HCL 50 MG
25 CAPSULE ORAL ONCE
Refills: 0 | Status: COMPLETED | OUTPATIENT
Start: 2019-10-01 | End: 2019-10-01

## 2019-10-01 RX ORDER — MAGNESIUM HYDROXIDE 400 MG/1
30 TABLET, CHEWABLE ORAL
Refills: 0 | Status: DISCONTINUED | OUTPATIENT
Start: 2019-10-01 | End: 2019-10-03

## 2019-10-01 RX ORDER — AER TRAVELER 0.5 G/1
1 SOLUTION RECTAL; TOPICAL EVERY 4 HOURS
Refills: 0 | Status: DISCONTINUED | OUTPATIENT
Start: 2019-10-01 | End: 2019-10-03

## 2019-10-01 RX ORDER — KETOROLAC TROMETHAMINE 30 MG/ML
30 SYRINGE (ML) INJECTION ONCE
Refills: 0 | Status: DISCONTINUED | OUTPATIENT
Start: 2019-10-01 | End: 2019-10-01

## 2019-10-01 RX ORDER — GLYCERIN ADULT
1 SUPPOSITORY, RECTAL RECTAL AT BEDTIME
Refills: 0 | Status: DISCONTINUED | OUTPATIENT
Start: 2019-10-01 | End: 2019-10-03

## 2019-10-01 RX ORDER — SODIUM CHLORIDE 9 MG/ML
3 INJECTION INTRAMUSCULAR; INTRAVENOUS; SUBCUTANEOUS EVERY 8 HOURS
Refills: 0 | Status: DISCONTINUED | OUTPATIENT
Start: 2019-10-01 | End: 2019-10-03

## 2019-10-01 RX ORDER — SIMETHICONE 80 MG/1
80 TABLET, CHEWABLE ORAL EVERY 4 HOURS
Refills: 0 | Status: DISCONTINUED | OUTPATIENT
Start: 2019-10-01 | End: 2019-10-03

## 2019-10-01 RX ORDER — DIPHENHYDRAMINE HCL 50 MG
25 CAPSULE ORAL EVERY 6 HOURS
Refills: 0 | Status: DISCONTINUED | OUTPATIENT
Start: 2019-10-01 | End: 2019-10-01

## 2019-10-01 RX ORDER — ACETAMINOPHEN 500 MG
975 TABLET ORAL
Refills: 0 | Status: DISCONTINUED | OUTPATIENT
Start: 2019-10-01 | End: 2019-10-03

## 2019-10-01 RX ORDER — OXYCODONE HYDROCHLORIDE 5 MG/1
5 TABLET ORAL
Refills: 0 | Status: DISCONTINUED | OUTPATIENT
Start: 2019-10-01 | End: 2019-10-03

## 2019-10-01 RX ORDER — PRAMOXINE HYDROCHLORIDE 150 MG/15G
1 AEROSOL, FOAM RECTAL EVERY 4 HOURS
Refills: 0 | Status: DISCONTINUED | OUTPATIENT
Start: 2019-10-01 | End: 2019-10-03

## 2019-10-01 RX ORDER — BENZOCAINE 10 %
1 GEL (GRAM) MUCOUS MEMBRANE EVERY 6 HOURS
Refills: 0 | Status: DISCONTINUED | OUTPATIENT
Start: 2019-10-01 | End: 2019-10-03

## 2019-10-01 RX ORDER — HYDROCORTISONE 1 %
1 OINTMENT (GRAM) TOPICAL EVERY 6 HOURS
Refills: 0 | Status: DISCONTINUED | OUTPATIENT
Start: 2019-10-01 | End: 2019-10-03

## 2019-10-01 RX ORDER — DIPHENHYDRAMINE HCL 50 MG
25 CAPSULE ORAL EVERY 4 HOURS
Refills: 0 | Status: DISCONTINUED | OUTPATIENT
Start: 2019-10-01 | End: 2019-10-03

## 2019-10-01 RX ORDER — ONDANSETRON 8 MG/1
4 TABLET, FILM COATED ORAL ONCE
Refills: 0 | Status: COMPLETED | OUTPATIENT
Start: 2019-10-01 | End: 2019-10-01

## 2019-10-01 RX ORDER — OXYCODONE HYDROCHLORIDE 5 MG/1
5 TABLET ORAL ONCE
Refills: 0 | Status: DISCONTINUED | OUTPATIENT
Start: 2019-10-01 | End: 2019-10-03

## 2019-10-01 RX ADMIN — Medication 1000 MILLIUNIT(S)/MIN: at 17:15

## 2019-10-01 RX ADMIN — SODIUM CHLORIDE 3 MILLILITER(S): 9 INJECTION INTRAMUSCULAR; INTRAVENOUS; SUBCUTANEOUS at 22:35

## 2019-10-01 RX ADMIN — Medication 25 MILLIGRAM(S): at 23:16

## 2019-10-01 RX ADMIN — Medication 108 GRAM(S): at 01:56

## 2019-10-01 RX ADMIN — Medication 0.25 MILLIGRAM(S): at 13:04

## 2019-10-01 RX ADMIN — Medication 108 GRAM(S): at 14:20

## 2019-10-01 RX ADMIN — Medication 30 MILLIGRAM(S): at 16:25

## 2019-10-01 RX ADMIN — ONDANSETRON 4 MILLIGRAM(S): 8 TABLET, FILM COATED ORAL at 14:25

## 2019-10-01 RX ADMIN — Medication 108 GRAM(S): at 06:17

## 2019-10-01 RX ADMIN — Medication 25 MILLIGRAM(S): at 20:00

## 2019-10-01 RX ADMIN — Medication 50 MILLIGRAM(S): at 00:34

## 2019-10-01 RX ADMIN — Medication 30 MILLIGRAM(S): at 16:03

## 2019-10-01 RX ADMIN — Medication 25 MILLIGRAM(S): at 16:06

## 2019-10-01 NOTE — DISCHARGE NOTE OB - CARE PLAN
Principal Discharge DX:	Normal vaginal delivery  Goal:	pain control, po intake ambulation  Assessment and plan of treatment:	as above

## 2019-10-01 NOTE — DISCHARGE NOTE OB - MATERIALS PROVIDED
[facility] [place] Screening Program/Back To Sleep Handout/Vaccinations/[facility] Hearing Screen Program/Shaken Baby Prevention Handout/[place]  Immunization Record/Breastfeeding Guide and Packet/Birth Certificate Instructions/Guide to Postpartum Care

## 2019-10-01 NOTE — OB RN DELIVERY SUMMARY - NS_SEPSISRSKCALC_OBGYN_ALL_OB_FT
EOS calculated successfully. EOS Risk Factor: 0.5/1000 live births ([facility] national incidence); GA=38w;Temp=98.6; ROM=4.917; GBS='Positive'; Antibiotics='Broad spectrum antibiotics > 4 hrs prior to birth'

## 2019-10-01 NOTE — CHART NOTE - NSCHARTNOTESELECT_GEN_ALL_CORE
Respiratory care arterial catheter      Indication:Access for blood gas measurement    Location:Left radial artery    Prep:Chlorhexidine    Drape:Local drape    Consent:Emergent procedure. Consent implied. Anesthesia: 1% Lidocaine infiltrated locally    Ultrasound used for line placement? Yes    Waveform:Good    Complications:None    Discussion: Intrapartum

## 2019-10-01 NOTE — DISCHARGE NOTE OB - PATIENT PORTAL LINK FT
You can access the FollowMyHealth Patient Portal offered by [facility] by registering at the following website: [email or web address]. By joining [employer]’s FollowMyHealth portal, you will also be able to view your health information using other applications (apps) compatible with our system.

## 2019-10-01 NOTE — CHART NOTE - NSCHARTNOTEFT_GEN_A_CORE
Associate chief of L & D    Patient was contraction q 1 minutes. she had already received terbutaline.  about to delivery.  spoke with Dr minaya who stated that she is close to delivery.    KONRAD Frazier.

## 2019-10-01 NOTE — CHART NOTE - NSCHARTNOTEFT_GEN_A_CORE
Patient seen and examined at bedside. Patient asleep, comfortable, declines pain medication/epidural at this time. Tracing reviewed - cat 1. Contractions q2-4 minutes. VE: 2.5/50/-3. Continue PO cytotec.

## 2019-10-01 NOTE — CHART NOTE - NSCHARTNOTEFT_GEN_A_CORE
patient seen at bedside. Patient resting comfortably with epidural   AROMed clear   VE: 6/80/-3   FHT category 2 with variable decels   Patient placed on lateral side, oxygen administered, FSE, and amnioinfusion started   Terb x 1 given   Tracing recovered   Will continue to monitor

## 2019-10-01 NOTE — DISCHARGE NOTE OB - CARE PROVIDER_API CALL
Erma Azar)  Gynecology Obstetrics  Gynecology  [address] [place] [address]  Phone: [phone]  Fax: [phone]  Follow Up Time:

## 2019-10-01 NOTE — OB PROVIDER DELIVERY SUMMARY - NSPROVIDERDELIVERYNOTE_OBGYN_ALL_OB_FT
Patient was found to be fully dilated. Pushed x 2 and delivery of male . 2nd degree laceration noted. Repair of laceration uncomplicated. Bimanual massage x 2 and small clots evacuated. Uterus firm.  Cytotec 1000mcg per rectum administered. EBL 500ml

## 2019-10-02 RX ORDER — IBUPROFEN 200 MG
1 TABLET ORAL
Qty: 0 | Refills: 0 | DISCHARGE
Start: 2019-10-02

## 2019-10-02 RX ORDER — ACETAMINOPHEN 500 MG
3 TABLET ORAL
Qty: 0 | Refills: 0 | DISCHARGE
Start: 2019-10-02

## 2019-10-02 RX ORDER — CHOLECALCIFEROL (VITAMIN D3) 125 MCG
1 CAPSULE ORAL
Qty: 0 | Refills: 0 | DISCHARGE

## 2019-10-02 RX ORDER — TETANUS TOXOID, REDUCED DIPHTHERIA TOXOID AND ACELLULAR PERTUSSIS VACCINE, ADSORBED 5; 2.5; 8; 8; 2.5 [IU]/.5ML; [IU]/.5ML; UG/.5ML; UG/.5ML; UG/.5ML
0.5 SUSPENSION INTRAMUSCULAR ONCE
Refills: 0 | Status: COMPLETED | OUTPATIENT
Start: 2019-10-02 | End: 2019-10-02

## 2019-10-02 RX ADMIN — Medication 1 TABLET(S): at 13:04

## 2019-10-02 RX ADMIN — SODIUM CHLORIDE 3 MILLILITER(S): 9 INJECTION INTRAMUSCULAR; INTRAVENOUS; SUBCUTANEOUS at 06:15

## 2019-10-02 RX ADMIN — Medication 600 MILLIGRAM(S): at 23:40

## 2019-10-02 RX ADMIN — Medication 600 MILLIGRAM(S): at 17:27

## 2019-10-02 RX ADMIN — Medication 100 MILLIGRAM(S): at 13:04

## 2019-10-02 RX ADMIN — Medication 600 MILLIGRAM(S): at 10:30

## 2019-10-02 RX ADMIN — Medication 600 MILLIGRAM(S): at 11:30

## 2019-10-02 RX ADMIN — Medication 600 MILLIGRAM(S): at 18:15

## 2019-10-02 RX ADMIN — TETANUS TOXOID, REDUCED DIPHTHERIA TOXOID AND ACELLULAR PERTUSSIS VACCINE, ADSORBED 0.5 MILLILITER(S): 5; 2.5; 8; 8; 2.5 SUSPENSION INTRAMUSCULAR at 23:39

## 2019-10-02 NOTE — PROGRESS NOTE ADULT - SUBJECTIVE AND OBJECTIVE BOX
INTERVAL HPI/OVERNIGHT EVENTS: Pt seen and examined at bedside.  Doing well, denies complaints. +voiding without difficulty, +ambulation, kristian reg diet. denies heavy lochia     MEDICATIONS  (STANDING):  acetaminophen   Tablet .. 975 milliGRAM(s) Oral <User Schedule>  diphtheria/tetanus/pertussis (acellular) Vaccine (ADAcel) 0.5 milliLiter(s) IntraMuscular once  ibuprofen  Tablet. 600 milliGRAM(s) Oral every 6 hours  influenza   Vaccine 0.5 milliLiter(s) IntraMuscular once  prenatal multivitamin 1 Tablet(s) Oral daily  sodium chloride 0.9% lock flush 3 milliLiter(s) IV Push every 8 hours    MEDICATIONS  (PRN):  benzocaine 20%/menthol 0.5% Spray 1 Spray(s) Topical every 6 hours PRN for Perineal discomfort  dibucaine 1% Ointment 1 Application(s) Topical every 6 hours PRN Perineal discomfort  diphenhydrAMINE 25 milliGRAM(s) Oral every 4 hours PRN Rash and/or Itching  docusate sodium 100 milliGRAM(s) Oral two times a day PRN For stool softening  glycerin Suppository - Adult 1 Suppository(s) Rectal at bedtime PRN Constipation  hydrocortisone 1% Cream 1 Application(s) Topical every 6 hours PRN Moderate Pain (4-6)  lanolin Ointment 1 Application(s) Topical every 6 hours PRN nipple soreness  magnesium hydroxide Suspension 30 milliLiter(s) Oral two times a day PRN Constipation  oxyCODONE    IR 5 milliGRAM(s) Oral every 3 hours PRN Moderate to Severe Pain (4-10)  oxyCODONE    IR 5 milliGRAM(s) Oral once PRN Moderate to Severe Pain (4-10)  pramoxine 1%/zinc 5% Cream 1 Application(s) Topical every 4 hours PRN Moderate Pain (4-6)  simethicone 80 milliGRAM(s) Chew every 4 hours PRN Gas  witch hazel Pads 1 Application(s) Topical every 4 hours PRN Perineal discomfort      Vital Signs Last 24 Hrs  T(C): 36.5 (02 Oct 2019 05:52), Max: 37.1 (01 Oct 2019 18:37)  T(F): 97.7 (02 Oct 2019 05:52), Max: 98.7 (01 Oct 2019 18:37)  HR: 61 (02 Oct 2019 05:52) (56 - 125)  BP: 111/60 (02 Oct 2019 05:52) (88/50 - 129/59)  BP(mean): --  RR: 17 (02 Oct 2019 05:52) (16 - 18)  SpO2: 99% (02 Oct 2019 05:52) (88% - 100%)    PHYSICAL EXAM:    GA: NAD, A+0 x 3  Abd: ( + ) BS, soft, nontender, nondistended, no rebound or guarding,   Uterus: Fundus midline; firm    LABS:                        10.5   7.16  )-----------( 238      ( 30 Sep 2019 17:10 )             34.9     09-30    137  |  104  |  10  ----------------------------<  130<H>  4.0   |  18<L>  |  0.57    Ca    9.2      30 Sep 2019 17:10    TPro  6.9  /  Alb  3.4  /  TBili  0.8  /  DBili  x   /  AST  41<H>  /  ALT  51<H>  /  AlkPhos  401<H>  09-30

## 2019-10-03 VITALS
TEMPERATURE: 98 F | DIASTOLIC BLOOD PRESSURE: 53 MMHG | HEART RATE: 60 BPM | OXYGEN SATURATION: 99 % | RESPIRATION RATE: 17 BRPM | SYSTOLIC BLOOD PRESSURE: 107 MMHG

## 2019-10-03 RX ADMIN — Medication 600 MILLIGRAM(S): at 00:10

## 2019-10-04 ENCOUNTER — EMERGENCY (EMERGENCY)
Facility: HOSPITAL | Age: 34
LOS: 1 days | Discharge: HOME | End: 2019-10-04
Attending: EMERGENCY MEDICINE | Admitting: EMERGENCY MEDICINE
Payer: COMMERCIAL

## 2019-10-04 VITALS — TEMPERATURE: 98 F

## 2019-10-04 VITALS
RESPIRATION RATE: 18 BRPM | SYSTOLIC BLOOD PRESSURE: 115 MMHG | HEART RATE: 65 BPM | TEMPERATURE: 99 F | DIASTOLIC BLOOD PRESSURE: 64 MMHG | OXYGEN SATURATION: 98 %

## 2019-10-04 DIAGNOSIS — Z90.49 ACQUIRED ABSENCE OF OTHER SPECIFIED PARTS OF DIGESTIVE TRACT: Chronic | ICD-10-CM

## 2019-10-04 LAB
ALBUMIN SERPL ELPH-MCNC: 3 G/DL — LOW (ref 3.3–5)
ALP SERPL-CCNC: 303 U/L — HIGH (ref 40–120)
ALT FLD-CCNC: 77 U/L — HIGH (ref 4–33)
ANION GAP SERPL CALC-SCNC: 13 MMO/L — SIGNIFICANT CHANGE UP (ref 7–14)
AST SERPL-CCNC: 68 U/L — HIGH (ref 4–32)
BASE EXCESS BLDV CALC-SCNC: -1.9 MMOL/L — SIGNIFICANT CHANGE UP
BILIRUB DIRECT SERPL-MCNC: < 0.2 MG/DL — SIGNIFICANT CHANGE UP (ref 0.1–0.2)
BILIRUB SERPL-MCNC: 0.3 MG/DL — SIGNIFICANT CHANGE UP (ref 0.2–1.2)
BLOOD GAS VENOUS - CREATININE: 0.69 MG/DL — SIGNIFICANT CHANGE UP (ref 0.5–1.3)
BLOOD GAS VENOUS - FIO2: 21 — SIGNIFICANT CHANGE UP
BUN SERPL-MCNC: 16 MG/DL — SIGNIFICANT CHANGE UP (ref 7–23)
CALCIUM SERPL-MCNC: 8.8 MG/DL — SIGNIFICANT CHANGE UP (ref 8.4–10.5)
CHLORIDE BLDV-SCNC: 110 MMOL/L — HIGH (ref 96–108)
CHLORIDE SERPL-SCNC: 107 MMOL/L — SIGNIFICANT CHANGE UP (ref 98–107)
CO2 SERPL-SCNC: 20 MMOL/L — LOW (ref 22–31)
CREAT SERPL-MCNC: 0.67 MG/DL — SIGNIFICANT CHANGE UP (ref 0.5–1.3)
GAS PNL BLDV: 140 MMOL/L — SIGNIFICANT CHANGE UP (ref 136–146)
GLUCOSE BLDV-MCNC: 94 MG/DL — SIGNIFICANT CHANGE UP (ref 70–99)
GLUCOSE SERPL-MCNC: 94 MG/DL — SIGNIFICANT CHANGE UP (ref 70–99)
HCO3 BLDV-SCNC: 23 MMOL/L — SIGNIFICANT CHANGE UP (ref 20–27)
HCT VFR BLD CALC: 29.9 % — LOW (ref 34.5–45)
HCT VFR BLDV CALC: 27.3 % — LOW (ref 34.5–45)
HGB BLD-MCNC: 8.6 G/DL — LOW (ref 11.5–15.5)
HGB BLDV-MCNC: 8.8 G/DL — LOW (ref 11.5–15.5)
LACTATE BLDV-MCNC: 1.2 MMOL/L — SIGNIFICANT CHANGE UP (ref 0.5–2)
MCHC RBC-ENTMCNC: 20.3 PG — LOW (ref 27–34)
MCHC RBC-ENTMCNC: 28.8 % — LOW (ref 32–36)
MCV RBC AUTO: 70.5 FL — LOW (ref 80–100)
NRBC # FLD: 0 K/UL — SIGNIFICANT CHANGE UP (ref 0–0)
PCO2 BLDV: 37 MMHG — LOW (ref 41–51)
PH BLDV: 7.4 PH — SIGNIFICANT CHANGE UP (ref 7.32–7.43)
PLATELET # BLD AUTO: 287 K/UL — SIGNIFICANT CHANGE UP (ref 150–400)
PMV BLD: 12.1 FL — SIGNIFICANT CHANGE UP (ref 7–13)
PO2 BLDV: 67 MMHG — HIGH (ref 35–40)
POTASSIUM BLDV-SCNC: 4 MMOL/L — SIGNIFICANT CHANGE UP (ref 3.4–4.5)
POTASSIUM SERPL-MCNC: 4 MMOL/L — SIGNIFICANT CHANGE UP (ref 3.5–5.3)
POTASSIUM SERPL-SCNC: 4 MMOL/L — SIGNIFICANT CHANGE UP (ref 3.5–5.3)
PROT SERPL-MCNC: 6.2 G/DL — SIGNIFICANT CHANGE UP (ref 6–8.3)
RBC # BLD: 4.24 M/UL — SIGNIFICANT CHANGE UP (ref 3.8–5.2)
RBC # FLD: 17.7 % — HIGH (ref 10.3–14.5)
SAO2 % BLDV: 92.1 % — HIGH (ref 60–85)
SODIUM SERPL-SCNC: 140 MMOL/L — SIGNIFICANT CHANGE UP (ref 135–145)
WBC # BLD: 8.13 K/UL — SIGNIFICANT CHANGE UP (ref 3.8–10.5)
WBC # FLD AUTO: 8.13 K/UL — SIGNIFICANT CHANGE UP (ref 3.8–10.5)

## 2019-10-04 PROCEDURE — 99284 EMERGENCY DEPT VISIT MOD MDM: CPT

## 2019-10-04 PROCEDURE — 76705 ECHO EXAM OF ABDOMEN: CPT | Mod: 26

## 2019-10-04 RX ORDER — IBUPROFEN 200 MG
600 TABLET ORAL ONCE
Refills: 0 | Status: DISCONTINUED | OUTPATIENT
Start: 2019-10-04 | End: 2019-10-04

## 2019-10-04 RX ORDER — IBUPROFEN 200 MG
600 TABLET ORAL ONCE
Refills: 0 | Status: COMPLETED | OUTPATIENT
Start: 2019-10-04 | End: 2019-10-04

## 2019-10-04 RX ORDER — LIDOCAINE 4 G/100G
1 CREAM TOPICAL ONCE
Refills: 0 | Status: COMPLETED | OUTPATIENT
Start: 2019-10-04 | End: 2019-10-04

## 2019-10-04 RX ADMIN — Medication 600 MILLIGRAM(S): at 19:31

## 2019-10-04 RX ADMIN — LIDOCAINE 1 PATCH: 4 CREAM TOPICAL at 19:31

## 2019-10-04 NOTE — ED ADULT NURSE NOTE - OBJECTIVE STATEMENT
34F  3 days vaginal post partum c/o non traumatic right buttock pain radiating down the right leg. Pt also c/o feeling itchy during her pregnancy and was told to have high liver enzymes. Her itchiness has continued. Pt denies chest pain, SOB n/v/d, heavy vaginal bleeding, abdominal pain.

## 2019-10-04 NOTE — ED PROVIDER NOTE - PHYSICAL EXAMINATION
GENERAL: NAD, well-developed  HEAD:  Atraumatic, Normocephalic  EYES: Scleral icterus, EOMI, PERRLA  NECK: Supple, No JVD  CHEST/LUNG: Clear to auscultation bilaterally; No wheeze/rhonchi/rales   HEART: Regular rate and rhythm; normal S1 S2,  No murmurs, rubs, or gallops  ABDOMEN: Soft, Nontender, Nondistended; Bowel sounds normal  EXTREMITIES:  2+ Peripheral Pulses, No clubbing, cyanosis, or edema  PSYCH: AAOx3, No acute distress   NEUROLOGY: +straight leg test on the right  SKIN: No rashes or lesions GENERAL: Jaundiced  HEAD:  Atraumatic, Normocephalic  EYES: Scleral icterus, EOMI, PERRLA  NECK: Supple, No JVD  CHEST/LUNG: Clear to auscultation bilaterally; No wheeze/rhonchi/rales   HEART: Regular rate and rhythm; normal S1 S2,  No murmurs, rubs, or gallops  ABDOMEN: Soft, Nontender, Nondistended; Bowel sounds normal  EXTREMITIES:  2+ Peripheral Pulses, No clubbing, cyanosis, or edema  PSYCH: AAOx3, No acute distress   NEUROLOGY: +straight leg test on the right  SKIN: Jaundiced skin

## 2019-10-04 NOTE — ED PROVIDER NOTE - ATTENDING CONTRIBUTION TO CARE
Pt presents with R lower back pain intermittent x3-4 months. Pt is s/p vaginal delivery 3 days ago, no abd pain, f/c/s or abnormal vaginal bleeding. She had intermittent R lower back pain while pregnant and was told it was likely because the baby was putting pressure on her spinal nerves. After the delivery she felt fine but yesyterday she noticed the back pain returned. She has a normal neruological exam, no midline spinal tenderness, pain is worse with movement and walking and relieved by rest, pain with straight leg raise on R. Advised pt that this is likely MSK pain and return precuations discussed at length such as fever, numbness/tingling/weakness. Otherwise, pt to f/u with PCP. In addition, labs and RUQ checked given recent h/o cholestasis of pregnancy- labs are normalizing and US unrevealing.

## 2019-10-04 NOTE — ED ADULT NURSE NOTE - CHIEF COMPLAINT QUOTE
s/p gave birth 3 days ago had to be induced as per pt because of liver function  pt appears slightly jaundiced

## 2019-10-04 NOTE — ED PROVIDER NOTE - NS ED ROS FT
CONSTITUTIONAL: No weakness, fevers or chills  EYES/ENT: No visual changes;  No vertigo or throat pain   NECK: No pain or stiffness  RESPIRATORY: No cough, wheezing, hemoptysis; No shortness of breath  CARDIOVASCULAR: No chest pain or palpitations  GASTROINTESTINAL: No abdominal or epigastric pain. No nausea, vomiting, or hematemesis; No diarrhea or constipation. No melena or hematochezia.  GENITOURINARY: No dysuria, frequency or hematuria  NEUROLOGICAL: No numbness or weakness  SKIN: generalized itchiness   MSK: right sided back pain as well as leg

## 2019-10-04 NOTE — ED ADULT TRIAGE NOTE - CHIEF COMPLAINT QUOTE
s/p gave birth 1 days ago had to be induced as per pt because of liver function  pt appears slightly jaundiced s/p gave birth 3 days ago had to be induced as per pt because of liver function  pt appears slightly jaundiced

## 2019-10-04 NOTE — ED PROVIDER NOTE - PROGRESS NOTE DETAILS
DH: Received sign out from outgoing resident. Patient currently in US. Will reassess when she returns. DH: Patient seen and evaluated. CAOx3, NAD, VSS. Patient states pain improved. RUQ US negative for acute pathology. Patient should follow up w/ PCP for elevated LFTs. D/w patient and acknowledged, all questions answered. Patient stable for d/c. Given strict return precautions.

## 2019-10-04 NOTE — ED PROVIDER NOTE - OBJECTIVE STATEMENT
34 YOM with no significant PMHx presenting with right sided back pain. She states that she has had this pain about 3 times during her pregnancy but her doctor thought the pain was related to her pregnancy. However, she had a successful delivery about 3 days ago (vaginal delivery) and the pain returned. She states the pain is worse with certain movements and gets better with rest. No weight loss, headaches, weakness, urinary or fecal incontinence. She has not tried anything for the pain at home. Of note, she was found to have cholestasis in September and found to have elevated liver enzymes that caused itchiness.

## 2019-10-04 NOTE — ED ADULT NURSE NOTE - NSIMPLEMENTINTERV_GEN_ALL_ED
Implemented All Universal Safety Interventions:  [place] to call system. Call bell, personal items and telephone within reach. Instruct patient to call for assistance. Room bathroom lighting operational. Non-slip footwear when patient is off stretcher. Physically safe environment: no spills, clutter or unnecessary equipment. Stretcher in lowest position, wheels locked, appropriate side rails in place.

## 2019-10-04 NOTE — ED PROVIDER NOTE - CLINICAL SUMMARY MEDICAL DECISION MAKING FREE TEXT BOX
34 year old female no PMH s/p  3d ago p/w right back pain radiating down leg. Patient had similar sx during pregnancy, thought to be related w/ pregnancy. No systemic symptoms, no red flag symptoms. +straight leg. Likely sciatica, but will obtain labs and RUQ US due to hx elevated LFT. Likely d/c home w/ PCP follow up.

## 2019-10-04 NOTE — ED PROVIDER NOTE - NSFOLLOWUPINSTRUCTIONS_ED_ALL_ED_FT
-Please follow up with your primary care doctor in 2-3 days.   -Continue taking all prescribed medications as directed.   -Take Tylenol 650mg (Two 325 mg pills) every 6 hours for pain.  -Return to the ER for any new or worsening symptoms.

## 2019-10-04 NOTE — ED PROVIDER NOTE - NSTIMEPROVIDERCAREINITIATE_GEN_ER
Pt is calling to review the 24 urine. Pt can be reached at [phone] or [phone]  
04-Oct-2019 18:20

## 2019-10-04 NOTE — ED PROVIDER NOTE - PATIENT PORTAL LINK FT
You can access the FollowMyHealth Patient Portal offered by [facility] by registering at the following website: [email or web address]. By joining [employer]’s FollowMyHealth portal, you will also be able to view your health information using other applications (apps) compatible with our system.

## 2019-12-12 NOTE — DISCHARGE NOTE OB - MOOD SWINGS / DEPRESSION OR CRYING SPELLS LASTING MORE THAN 3 DAYS
Behavioral Health  Note   Behavioral Health  Spirituality Group Note     [facility]    Name: Zackery Zamudio    YOB: 2003   MRN: [id number]    Age: 16 year old     Patient attended -led group, which included discussion of spirituality, coping with illness and building resilience.   Today's topic was Values. Co-facilitated by Shi Vazquez [facility]  Patient attended group for 1 hrs.   The patient actively participated in group discussion and patient demonstrated an appreciation of topic's application for their personal circumstances.     Wayne Leung, [facility], DMin  Staff    Pager [phone]       Statement Selected

## 2020-03-03 NOTE — OB RN PATIENT PROFILE - EDUCATION OF THE PLACEMENT OF INFANT DURING SKIN TO SKIN: THE INFANT IS TO BE PLACED BELLY DOWN DIRECTLY ON PARENT'S CHEST, POSITIONED WITH INFANT'S FACE TOWARD PARENT'S FACE, SO THE PARENT CAN OBSERVE THE INFANT’S COLOR AT ALL TIMES.
1. We discussed the risks of surgery including bleeding, infection, damage to surrounding structures, conversion to open, hernia recurrence, chronic pain as well as anesthesia related complications. Increased risk of recurrence is associated with smoking, diabetes, obesity as well as heavy lifting over 20lbs sooner than 6 weeks after the surgery. We also discussed minimally invasive vs open technique. The benefits of the procedure include repair of the hernia, prevention of future incarceration and return to normal daily activity. Alternatives discussed include close observation. Details of the procedure were discussed and all questions answered. The patient understands, agrees, and wishes to proceed with minimally invasive procedure  2. Warning signs of an incarcerated hernia include inability to reduce the bulge, increased and constant pain, nausea, vomiting, fevers, chills and constipation. This is a surgical emergency and the patient should contact the office or present to an emergency department  3. Will schedule for robot assisted laparoscopic left and possible bilateral inguinal hernia repair with mesh with general anesthesia and as outpatient procedure  4. Counseled that the best predictor of postoperative pain is preoperative pain. The hernia is reducible and therefore not emergent. Will schedule him later this week. If he has significant uncontrolled pain postoperatively, he may benefit from referral to pain specialist  5.  Will assist with primary care establishment at his postoperative visit as well
Statement Selected

## 2020-07-08 NOTE — OB PROVIDER TRIAGE NOTE - NSFIRSTLIVEBIRTH_OBGYN_ALL_OB_DT
Consent: The patient's consent was obtained including but not limited to risks of crusting, scabbing, blistering, scarring, darker or lighter pigmentary change, recurrence, incomplete removal and infection. Render Post-Care Instructions In Note?: no Detail Level: Simple Duration Of Freeze Thaw-Cycle (Seconds): 7 Post-Care Instructions: I reviewed with the patient in detail post-care instructions. Patient is to wear sunprotection, and avoid picking at any of the treated lesions. Pt may apply Vaseline to crusted or scabbing areas. Total Number Of Aks Treated: 5 Number Of Freeze-Thaw Cycles: 1 freeze-thaw cycle 09-Sep-2017

## 2020-11-04 NOTE — OB RN PATIENT PROFILE - ABORTIONS, OB PROFILE
Anahy Perez MD  You 2 days ago     Elliott Gauthier,   Can you please request sensitivities on this M. Lentiflavum.   Thanks   Anahy          0

## 2021-09-15 NOTE — OB RN DELIVERY SUMMARY - BABY A: APGAR 5 MIN RESP RATE, DELIVERY
Procedure: Vasectomy bilateral.   Anesthesia: Local lidocaine injection by surgeon.  Surgeon: FRANCISCO Pastor M.D.   Assistant:  None     Description of procedure: After the patient received education material regarding vasectomy, including risks and benefits, we proceeded with obtaining consent and proceeded with the surgery. He understands that there is a risk of late failure from recanalization. He understands that he is fertile until he has completed one or more semen analyses and he is given clearance from us for unprotected intercourse.  He understands that we will contact regarding the results but it is his responsibility to make sure he is cleared before having unprotected intercourse.  I have discussed with him other risks including bleeding, infection, acute and possible long-term pain.    The right vas was ligated first.  This was done using the standard technique by grasping it with a three-finger  and lifting it up to the skin.  A small amount of lidocaine was infiltrated into the skin and vasal sheath.  The skin was punctured and dilated bluntly using the scalpel-less dissector.  The sheath was identified and a rigid clamp was passed around the vas which was then lifted out of the incision.  The vas was cleaned off from the deferential vessels and the sheath, and cautery was used to divide the vas between mosquitos.  A small segment was removed and discarded.  The lumen of the vas was cannulated to confirm its identity, and the lumens of both ends were cauterized.  Pen cautery was used sparingly/as needed for minor bleeders.  A facial interposition was then performed with a single suture of 4-0 Monocryl. The skin defect was closed with a 4-0 chromic interrupted suture after confirming adequate hemostasis.       We then turned our attention to the left side and a similar technique was performed. This involved division, excision of a segment, cautery of the lumens, and fascial interposition.    There were  no hematomas noted at the end of the procedure.  The patient tolerated the procedure well.    He was advised to return for a post vasectomy semen check in 3 months, and that he is not sterile and must continue to use contraception until we tell him otherwise (based on follow-up semen specimen(s)).    Plan:  -Post vasectomy semen analysis in 3 months   -ice packs to scrotum X 24h  -shower is OK tomorrow.  - over-the-counter analgesics recommended.    Don WALLACE                   (2) good, crying

## 2023-04-28 NOTE — PROGRESS NOTE ADULT - PROBLEM SELECTOR PLAN 1
- Continue with po analgesia  - Increase ambulation  - Continue regular diet  - IV lock  - CBC this am
- Pain well controlled, continue current pain regimen  - Increase ambulation  - Continue regular diet  - Discharge planning
pcp

## 2025-06-19 NOTE — DISCHARGE NOTE OB - WITHDRAWAL SYMPTOMS INCLUDE; NEGATIVE MOOD, URGES TO SMOKE, AND DIFFICULTY CONCENTRATING.
Received denial from [employer] for sevelamer carbonate packet.  Komal RAYMOND aware of denial.    
Statement Selected